# Patient Record
Sex: FEMALE | Race: WHITE | Employment: OTHER | URBAN - METROPOLITAN AREA
[De-identification: names, ages, dates, MRNs, and addresses within clinical notes are randomized per-mention and may not be internally consistent; named-entity substitution may affect disease eponyms.]

---

## 2017-04-04 ENCOUNTER — OFFICE VISIT (OUTPATIENT)
Dept: INTERNAL MEDICINE CLINIC | Age: 77
End: 2017-04-04

## 2017-04-04 ENCOUNTER — HOSPITAL ENCOUNTER (OUTPATIENT)
Dept: LAB | Age: 77
Discharge: HOME | End: 2017-04-04
Payer: MEDICARE

## 2017-04-04 VITALS
BODY MASS INDEX: 39.49 KG/M2 | HEIGHT: 65 IN | OXYGEN SATURATION: 98 % | SYSTOLIC BLOOD PRESSURE: 114 MMHG | HEART RATE: 68 BPM | DIASTOLIC BLOOD PRESSURE: 72 MMHG | TEMPERATURE: 97.3 F | RESPIRATION RATE: 20 BRPM | WEIGHT: 237 LBS

## 2017-04-04 DIAGNOSIS — G89.29 CHRONIC PAIN OF RIGHT LOWER EXTREMITY: Primary | ICD-10-CM

## 2017-04-04 DIAGNOSIS — I25.83 CORONARY ARTERY DISEASE DUE TO LIPID RICH PLAQUE: ICD-10-CM

## 2017-04-04 DIAGNOSIS — G47.00 INSOMNIA, UNSPECIFIED TYPE: ICD-10-CM

## 2017-04-04 DIAGNOSIS — M17.9 OSTEOARTHRITIS OF KNEE, UNSPECIFIED LATERALITY, UNSPECIFIED OSTEOARTHRITIS TYPE: ICD-10-CM

## 2017-04-04 DIAGNOSIS — E55.9 VITAMIN D DEFICIENCY: ICD-10-CM

## 2017-04-04 DIAGNOSIS — E78.00 PURE HYPERCHOLESTEROLEMIA: ICD-10-CM

## 2017-04-04 DIAGNOSIS — R26.89 BALANCE PROBLEM: ICD-10-CM

## 2017-04-04 DIAGNOSIS — M79.604 CHRONIC PAIN OF RIGHT LOWER EXTREMITY: Primary | ICD-10-CM

## 2017-04-04 DIAGNOSIS — I25.10 CORONARY ARTERY DISEASE DUE TO LIPID RICH PLAQUE: ICD-10-CM

## 2017-04-04 PROCEDURE — 80053 COMPREHEN METABOLIC PANEL: CPT

## 2017-04-04 PROCEDURE — 85025 COMPLETE CBC W/AUTO DIFF WBC: CPT

## 2017-04-04 PROCEDURE — 82306 VITAMIN D 25 HYDROXY: CPT

## 2017-04-04 RX ORDER — DICLOFENAC SODIUM 10 MG/G
4 GEL TOPICAL
Qty: 300 G | Refills: 2 | Status: SHIPPED | OUTPATIENT
Start: 2017-04-04 | End: 2017-05-02

## 2017-04-04 RX ORDER — ZOLPIDEM TARTRATE 5 MG/1
TABLET ORAL
Qty: 45 TAB | Refills: 3 | Status: SHIPPED | OUTPATIENT
Start: 2017-04-04

## 2017-04-04 RX ORDER — HYDROCODONE BITARTRATE AND ACETAMINOPHEN 5; 325 MG/1; MG/1
1 TABLET ORAL
Qty: 20 TAB | Refills: 0 | Status: SHIPPED | OUTPATIENT
Start: 2017-04-04 | End: 2017-05-02

## 2017-04-04 NOTE — PROGRESS NOTES
Health Maintenance Due   Topic Date Due    DTaP/Tdap/Td series (1 - Tdap) 06/17/1960    ZOSTER VACCINE AGE 60>  06/17/1999    GLAUCOMA SCREENING Q2Y  06/17/2004    OSTEOPOROSIS SCREENING (DEXA)  06/17/2004    Pneumococcal 65+ Low/Medium Risk (1 of 2 - PCV13) 06/17/2004    MEDICARE YEARLY EXAM  06/17/2004       Chief Complaint   Patient presents with    Follow-up     4 month    Coronary Artery Disease    Hypothyroidism    Depression    Leg Pain     bilateral legs muscle pain       1. Have you been to the ER, urgent care clinic since your last visit? Hospitalized since your last visit? No    2. Have you seen or consulted any other health care providers outside of the [address] [place] [address] since your last visit? Include any pap smears or colon screening. No    3) Do you have an Advance Directive on file? no    4) Are you interested in receiving information on Advance Directives? NO      Patient is accompanied by self I have received verbal consent from Kirill Lu to discuss any/all medical information while they are present in the room.

## 2017-04-04 NOTE — MR AVS SNAPSHOT
Visit Information Date & Time Provider Department Dept. Phone Encounter #  
 4/4/2017 11:30 AM Darylene Skinner, MD [facility] Internal Medicine [facility] [phone] [id number] Upcoming Health Maintenance Date Due DTaP/Tdap/Td series (1 - Tdap) 6/17/1960 ZOSTER VACCINE AGE 60> 6/17/1999 GLAUCOMA SCREENING Q2Y 6/17/2004 OSTEOPOROSIS SCREENING (DEXA) 6/17/2004 Pneumococcal 65+ Low/Medium Risk (1 of 2 - PCV13) 6/17/2004 MEDICARE YEARLY EXAM 6/17/2004 Allergies as of 4/4/2017  Review Complete On: 4/4/2017 By: Darylene Skinner, MD  
 No Known Allergies Current Immunizations  Never Reviewed No immunizations on file. Not reviewed this visit You Were Diagnosed With   
  
 Codes Comments Chronic pain of right lower extremity    -  Primary ICD-10-CM: M79.604, T44.69 ICD-9-CM: 729.5, 338.29 Osteoarthritis of knee, unspecified laterality, unspecified osteoarthritis type     ICD-10-CM: M17.10 ICD-9-CM: 715.36 Coronary artery disease due to lipid rich plaque     ICD-10-CM: I25.10, I25.83 ICD-9-CM: 414.00, 414.3 Pure hypercholesterolemia     ICD-10-CM: E78.00 ICD-9-CM: 272.0 Insomnia, unspecified type     ICD-10-CM: G47.00 ICD-9-CM: 780.52 Balance problem     ICD-10-CM: R26.89 
ICD-9-CM: 781.99 Vitamin D deficiency     ICD-10-CM: E55.9 ICD-9-CM: 268.9 Vitals BP Pulse Temp Resp Height(growth percentile) Weight(growth percentile) 114/72 (BP 1 Location: Left arm, BP Patient Position: Sitting) 68 97.3 °F (36.3 °C) (Oral) 20 5' 5\" (1.651 m) 237 lb (107.5 kg) SpO2 BMI OB Status Smoking Status 98% 39.44 kg/m2 Postmenopausal Never Smoker Vitals History BMI and BSA Data Body Mass Index Body Surface Area  
 39.44 kg/m 2 2.22 m 2 Preferred Pharmacy Pharmacy Name Phone [facility]. [phone] Your Updated Medication List  
  
   
 This list is accurate as of: 17 12:31 PM.  Always use your most recent med list.  
  
  
  
  
 aspirin 500 mg tablet Take 500 mg by mouth daily. clopidogrel 75 mg Tab Commonly known as:  PLAVIX TAKE 1 TABLET BY MOUTH EVERY DAY. diclofenac 1 % Gel Commonly known as:  VOLTAREN Apply 4 g to affected area three (3) times daily as needed. HYDROcodone-acetaminophen 5-325 mg per tablet Commonly known as:  Lia Rook Take 1 Tab by mouth two (2) times daily as needed for Pain. Max Daily Amount: 2 Tabs. levothyroxine 100 mcg tablet Commonly known as:  SYNTHROID  
TAKE 1 TABLET BY MOUTH EVERY MORNING. NexIUM 40 mg capsule Generic drug:  esomeprazole TAKE 1 CAPSULE BY MOUTH EVERY DAY. zolpidem 5 mg tablet Commonly known as:  AMBIEN  
1 tab po HS PRN Prescriptions Printed Refills  
 zolpidem (AMBIEN) 5 mg tablet 3 Si tab po HS PRN Class: Print HYDROcodone-acetaminophen (NORCO) 5-325 mg per tablet 0 Sig: Take 1 Tab by mouth two (2) times daily as needed for Pain. Max Daily Amount: 2 Tabs. Class: Print Route: Oral  
  
Prescriptions Sent to Pharmacy Refills  
 diclofenac (VOLTAREN) 1 % gel 2 Sig: Apply 4 g to affected area three (3) times daily as needed. Class: Normal  
 Pharmacy: [facility] Riggins Ph #: [phone] Route: Topical  
  
We Performed the Following CBC WITH AUTOMATED DIFF [24245 CPT(R)] METABOLIC PANEL, COMPREHENSIVE [34873 CPT(R)] REFERRAL TO PHYSICAL THERAPY [MAB68 Custom] Comments:  
 Please evaluate patient for gait and balance and ch right leg pain VITAMIN D, 25 HYDROXY [id number] CPT(R)] Referral Information Referral ID Referred By Referred To  
  
 [id number] Bryson Banda Not Available Visits Status Start Date End Date 1 New Request 17 If your referral has a status of pending review or denied, additional information will be sent to support the outcome of this decision. Introducing [facility] SERVICES! Jax Arreolas introduces [employer] patient portal. Now you can access parts of your medical record, email your doctor's office, and request medication refills online. 1. In your internet browser, go to [email or web address] 2. Click on the First Time User? Click Here link in the Sign In box. You will see the New Member Sign Up page. 3. Enter your [employer] Access Code exactly as it appears below. You will not need to use this code after youve completed the sign-up process. If you do not sign up before the expiration date, you must request a new code. · [employer] Access Code: [email or web address] Expires: 7/3/2017 12:31 PM 
 
4. Enter the last four digits of your Social Security Number (xxxx) and Date of Birth (mm/dd/yyyy) as indicated and click Submit. You will be taken to the next sign-up page. 5. Create a S[employer] ID. This will be your [employer] login ID and cannot be changed, so think of one that is secure and easy to remember. 6. Create a [employer] password. You can change your password at any time. 7. Enter your Password Reset Question and Answer. This can be used at a later time if you forget your password. 8. Enter your e-mail address. You will receive e-mail notification when new information is available in [address]. 9. Click Sign Up. You can now view and download portions of your medical record. 10. Click the Download Summary menu link to download a portable copy of your medical information. If you have questions, please visit the Frequently Asked Questions section of the [employer] website. Remember, [employer] is NOT to be used for urgent needs. For medical emergencies, dial 911. Now available from your iPhone and Android! Please provide this summary of care documentation to your next provider. Your primary care clinician is listed as Paul Servin. If you have any questions after today's visit, please call [phone].

## 2017-04-04 NOTE — PROGRESS NOTES
HISTORY OF PRESENT ILLNESS  Nelson [facility] is a 67 y.o. female here for follow  Up. Reports ch right leg pain. She had DVT in past,rigth leg is little swollen. She has chronic pain. She has CAD with stent placement,taking plavix 75 mg everyday. Has balance problem. need PT. She has elevated lipid,on statin. no myalgia. Follow-up   Pertinent negatives include no chest pain. Depression   Pertinent negatives include no chest pain. Leg Pain      Medication Refill   Pertinent negatives include no chest pain. Cholesterol Problem   Pertinent negatives include no chest pain. Review of Systems   Constitutional: Negative. Negative for chills and fever. HENT: Negative. Eyes: Negative. Negative for blurred vision and double vision. Respiratory: Negative. Cardiovascular: Negative. Negative for chest pain and palpitations. Gastrointestinal: Negative. Genitourinary: Negative. Musculoskeletal:        Leg pain     Neurological: Negative. Psychiatric/Behavioral: Positive for depression. Physical Exam   Constitutional: She appears well-developed and well-nourished. No distress. Neck: Normal range of motion. Neck supple. No JVD present. No thyromegaly present. Cardiovascular: Normal rate, regular rhythm, normal heart sounds and intact distal pulses. Pulmonary/Chest: Effort normal and breath sounds normal. No respiratory distress. She has no wheezes. She has no rales. Musculoskeletal: She exhibits no edema or tenderness. Right leg and thigh,slightly swollen. NT   Psychiatric: She has a normal mood and affect. Her behavior is normal.       ASSESSMENT and PLAN  Sandi Colbert was seen today for follow-up, coronary artery disease, hypothyroidism, depression and leg pain. Diagnoses and all orders for this visit:    Chronic pain of right lower extremity  Will call in,  -     HYDROcodone-acetaminophen (NORCO) 5-325 mg per tablet; Take 1 Tab by mouth two (2) times daily as needed for Pain.  Max Daily Amount: 2 Tabs. -     REFERRAL TO PHYSICAL THERAPY  -     CBC WITH AUTOMATED DIFF    Osteoarthritis of knee, unspecified laterality, unspecified osteoarthritis type  Will do,  -     diclofenac (VOLTAREN) 1 % gel; Apply 4 g to affected area three (3) times daily as needed. Coronary artery disease due to lipid rich plaque    Stable. -     METABOLIC PANEL, COMPREHENSIVE  -     CBC WITH AUTOMATED DIFF    Pure hypercholesterolemia  Stable. Insomnia, unspecified type  Will refill,  -     zolpidem (AMBIEN) 5 mg tablet; 1 tab po HS PRN    Balance problem  -     REFERRAL TO PHYSICAL THERAPY    Vitamin D deficiency  -     VITAMIN D, 25 HYDROXY        Discussed expected course/resolution/complications of diagnosis in detail with patient. Medication risks/benefits/costs/interactions/alternatives discussed with patient. Pt was given an after visit summary which includes diagnoses, current medications & vitals. Pt expressed understanding with the diagnosis and plan.

## 2017-04-04 NOTE — LETTER
4/6/2017 1:57 PM 
 
Ms. Rosas Goddard [address] [place] [address] [id number] Dear Rosas Goddard: 
 
Please find your most recent results below. Resulted Orders VITAMIN D, 25 HYDROXY Result Value Ref Range VITAMIN D, 25-HYDROXY 21.7 (L) 30.0 - 100.0 ng/mL Comment:  
   Vitamin D deficiency has been defined by the [facility] practice guideline as a 
level of serum 25-OH vitamin D less than 20 ng/mL (1,2). The Endocrine Society went on to further define vitamin D 
insufficiency as a level between 21 and 29 ng/mL (2). 1. IOM ([place] of Medicine). 2010. Dietary reference 
   intakes for calcium and D. [facility]: The 
   [employer]. 2. Genny MF, Sherri NC, Dk LEDEZMA, et al. 
   Evaluation, treatment, and prevention of vitamin D 
   deficiency: an Endocrine Society clinical practice 
   guideline. JCEM. 2011 Jul; 96(7):1911-30. Narrative Performed at:  [address]  [id number] : Elsy King MD, Phone:  [phone] METABOLIC PANEL, COMPREHENSIVE Result Value Ref Range Glucose 85 65 - 99 mg/dL BUN 18 8 - 27 mg/dL Creatinine 1.00 0.57 - 1.00 mg/dL GFR est non-AA 54 (L) >59 mL/min/1.73 GFR est AA 63 >59 mL/min/1.73  
 BUN/Creatinine ratio 18 12 - 28 Comment: **Please note reference interval change** Sodium 140 134 - 144 mmol/L Potassium 5.0 3.5 - 5.2 mmol/L Chloride 103 96 - 106 mmol/L  
 CO2 17 (L) 18 - 29 mmol/L Calcium 9.0 8.7 - 10.3 mg/dL Protein, total 6.8 6.0 - 8.5 g/dL Albumin 4.0 3.5 - 4.8 g/dL GLOBULIN, TOTAL 2.8 1.5 - 4.5 g/dL A-G Ratio 1.4 1.2 - 2.2 Comment: **Please note reference interval change** Bilirubin, total 0.3 0.0 - 1.2 mg/dL Alk.  phosphatase 56 39 - 117 IU/L  
 AST (SGOT) 27 0 - 40 IU/L  
 ALT (SGPT) 14 0 - 32 IU/L  
 Narrative Performed at:  [address]  [id number] : Lopez Sam MD, Phone:  [phone] CBC WITH AUTOMATED DIFF Result Value Ref Range WBC 7.6 3.4 - 10.8 x10E3/uL  
 RBC 4.18 3.77 - 5.28 x10E6/uL HGB 12.8 11.1 - 15.9 g/dL HCT 39.7 34.0 - 46.6 % MCV 95 79 - 97 fL  
 MCH 30.6 26.6 - 33.0 pg  
 MCHC 32.2 31.5 - 35.7 g/dL  
 RDW 14.9 12.3 - 15.4 % PLATELET 510 410 - 328 x10E3/uL NEUTROPHILS 54 % Lymphocytes 31 % MONOCYTES 9 % EOSINOPHILS 5 % BASOPHILS 1 %  
 ABS. NEUTROPHILS 4.1 1.4 - 7.0 x10E3/uL Abs Lymphocytes 2.4 0.7 - 3.1 x10E3/uL  
 ABS. MONOCYTES 0.7 0.1 - 0.9 x10E3/uL  
 ABS. EOSINOPHILS 0.4 0.0 - 0.4 x10E3/uL  
 ABS. BASOPHILS 0.1 0.0 - 0.2 x10E3/uL IMMATURE GRANULOCYTES 0 %  
 ABS. IMM. GRANS. 0.0 0.0 - 0.1 x10E3/uL Narrative Performed at:  [address]  [id number] : Lopez Sam MD, Phone:  [phone] CKD REPORT Result Value Ref Range Interpretation Note Comment:  
   Supplement report is available. Narrative Performed at:  [address] [place] [address]  [id number] : Janes Christian PhD, Phone:  [phone] RECOMMENDATIONS: 
Damian Eisenmenger your labs indicate that Your Vitamin D level is low, start taking OTC Vitamin D 2000 units 1 x a day for 4 months. Also increase your consumption of milk and exposure to the sun for 20 minutes a day. We will recheck your Vitamin D level in 4 months All other labs are stable Please call me if you have any questions: [phone] Sincerely, Gavin Esquivel MD

## 2017-04-05 LAB
25(OH)D3+25(OH)D2 SERPL-MCNC: 21.7 NG/ML (ref 30–100)
ALBUMIN SERPL-MCNC: 4 G/DL (ref 3.5–4.8)
ALBUMIN/GLOB SERPL: 1.4 {RATIO} (ref 1.2–2.2)
ALP SERPL-CCNC: 56 IU/L (ref 39–117)
ALT SERPL-CCNC: 14 IU/L (ref 0–32)
AST SERPL-CCNC: 27 IU/L (ref 0–40)
BASOPHILS # BLD AUTO: 0.1 X10E3/UL (ref 0–0.2)
BASOPHILS NFR BLD AUTO: 1 %
BILIRUB SERPL-MCNC: 0.3 MG/DL (ref 0–1.2)
BUN SERPL-MCNC: 18 MG/DL (ref 8–27)
BUN/CREAT SERPL: 18 (ref 12–28)
CALCIUM SERPL-MCNC: 9 MG/DL (ref 8.7–10.3)
CHLORIDE SERPL-SCNC: 103 MMOL/L (ref 96–106)
CO2 SERPL-SCNC: 17 MMOL/L (ref 18–29)
CREAT SERPL-MCNC: 1 MG/DL (ref 0.57–1)
EOSINOPHIL # BLD AUTO: 0.4 X10E3/UL (ref 0–0.4)
EOSINOPHIL NFR BLD AUTO: 5 %
ERYTHROCYTE [DISTWIDTH] IN BLOOD BY AUTOMATED COUNT: 14.9 % (ref 12.3–15.4)
GLOBULIN SER CALC-MCNC: 2.8 G/DL (ref 1.5–4.5)
GLUCOSE SERPL-MCNC: 85 MG/DL (ref 65–99)
HCT VFR BLD AUTO: 39.7 % (ref 34–46.6)
HGB BLD-MCNC: 12.8 G/DL (ref 11.1–15.9)
IMM GRANULOCYTES # BLD: 0 X10E3/UL (ref 0–0.1)
IMM GRANULOCYTES NFR BLD: 0 %
INTERPRETATION: NORMAL
LYMPHOCYTES # BLD AUTO: 2.4 X10E3/UL (ref 0.7–3.1)
LYMPHOCYTES NFR BLD AUTO: 31 %
MCH RBC QN AUTO: 30.6 PG (ref 26.6–33)
MCHC RBC AUTO-ENTMCNC: 32.2 G/DL (ref 31.5–35.7)
MCV RBC AUTO: 95 FL (ref 79–97)
MONOCYTES # BLD AUTO: 0.7 X10E3/UL (ref 0.1–0.9)
MONOCYTES NFR BLD AUTO: 9 %
NEUTROPHILS # BLD AUTO: 4.1 X10E3/UL (ref 1.4–7)
NEUTROPHILS NFR BLD AUTO: 54 %
PLATELET # BLD AUTO: 296 X10E3/UL (ref 150–379)
POTASSIUM SERPL-SCNC: 5 MMOL/L (ref 3.5–5.2)
PROT SERPL-MCNC: 6.8 G/DL (ref 6–8.5)
RBC # BLD AUTO: 4.18 X10E6/UL (ref 3.77–5.28)
SODIUM SERPL-SCNC: 140 MMOL/L (ref 134–144)
WBC # BLD AUTO: 7.6 X10E3/UL (ref 3.4–10.8)

## 2017-04-30 ENCOUNTER — HOSPITAL ENCOUNTER (EMERGENCY)
Age: 77
Discharge: HOME | End: 2017-04-30
Attending: STUDENT IN AN ORGANIZED HEALTH CARE EDUCATION/TRAINING PROGRAM
Payer: MEDICARE

## 2017-04-30 VITALS
SYSTOLIC BLOOD PRESSURE: 144 MMHG | RESPIRATION RATE: 18 BRPM | WEIGHT: 230 LBS | BODY MASS INDEX: 39.27 KG/M2 | HEIGHT: 64 IN | TEMPERATURE: 98 F | OXYGEN SATURATION: 98 % | HEART RATE: 60 BPM | DIASTOLIC BLOOD PRESSURE: 54 MMHG
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
## 2017-04-30 LAB
ALBUMIN SERPL BCP-MCNC: 3.8 G/DL (ref 3.5–5)
ALBUMIN/GLOB SERPL: 0.9 {RATIO} (ref 1.1–2.2)
ALP SERPL-CCNC: 70 U/L (ref 45–117)
ALT SERPL-CCNC: 21 U/L (ref 12–78)
ANION GAP BLD CALC-SCNC: 7 MMOL/L (ref 5–15)
APPEARANCE UR: ABNORMAL
AST SERPL W P-5'-P-CCNC: 18 U/L (ref 15–37)
BACTERIA URNS QL MICRO: ABNORMAL /HPF
BASOPHILS # BLD AUTO: 0 K/UL (ref 0–0.1)
BASOPHILS # BLD: 0 % (ref 0–1)
BILIRUB SERPL-MCNC: 0.5 MG/DL (ref 0.2–1)
BILIRUB UR QL: NEGATIVE
BUN SERPL-MCNC: 20 MG/DL (ref 6–20)
BUN/CREAT SERPL: 22 (ref 12–20)
CALCIUM SERPL-MCNC: 9.1 MG/DL (ref 8.5–10.1)
CHLORIDE SERPL-SCNC: 110 MMOL/L (ref 97–108)
CK SERPL-CCNC: 123 U/L (ref 26–192)
CO2 SERPL-SCNC: 26 MMOL/L (ref 21–32)
COLOR UR: ABNORMAL
CREAT SERPL-MCNC: 0.92 MG/DL (ref 0.55–1.02)
EOSINOPHIL # BLD: 0 K/UL (ref 0–0.4)
EOSINOPHIL NFR BLD: 0 % (ref 0–7)
EPITH CASTS URNS QL MICRO: ABNORMAL /LPF
ERYTHROCYTE [DISTWIDTH] IN BLOOD BY AUTOMATED COUNT: 14.2 % (ref 11.5–14.5)
GLOBULIN SER CALC-MCNC: 4.3 G/DL (ref 2–4)
GLUCOSE SERPL-MCNC: 113 MG/DL (ref 65–100)
GLUCOSE UR STRIP.AUTO-MCNC: NEGATIVE MG/DL
HCT VFR BLD AUTO: 43.6 % (ref 35–47)
HGB BLD-MCNC: 14.1 G/DL (ref 11.5–16)
HGB UR QL STRIP: ABNORMAL
HYALINE CASTS URNS QL MICRO: ABNORMAL /LPF (ref 0–5)
KETONES UR QL STRIP.AUTO: NEGATIVE MG/DL
LEUKOCYTE ESTERASE UR QL STRIP.AUTO: ABNORMAL
LYMPHOCYTES # BLD AUTO: 13 % (ref 12–49)
LYMPHOCYTES # BLD: 1.4 K/UL (ref 0.8–3.5)
MCH RBC QN AUTO: 31.1 PG (ref 26–34)
MCHC RBC AUTO-ENTMCNC: 32.3 G/DL (ref 30–36.5)
MCV RBC AUTO: 96 FL (ref 80–99)
MONOCYTES # BLD: 0.8 K/UL (ref 0–1)
MONOCYTES NFR BLD AUTO: 7 % (ref 5–13)
NEUTS SEG # BLD: 8.8 K/UL (ref 1.8–8)
NEUTS SEG NFR BLD AUTO: 80 % (ref 32–75)
NITRITE UR QL STRIP.AUTO: NEGATIVE
PH UR STRIP: 5 [PH] (ref 5–8)
PLATELET # BLD AUTO: 335 K/UL (ref 150–400)
POTASSIUM SERPL-SCNC: 4.6 MMOL/L (ref 3.5–5.1)
PROT SERPL-MCNC: 8.1 G/DL (ref 6.4–8.2)
PROT UR STRIP-MCNC: NEGATIVE MG/DL
RBC # BLD AUTO: 4.54 M/UL (ref 3.8–5.2)
RBC #/AREA URNS HPF: ABNORMAL /HPF (ref 0–5)
SODIUM SERPL-SCNC: 143 MMOL/L (ref 136–145)
SP GR UR REFRACTOMETRY: 1.03 (ref 1–1.03)
UROBILINOGEN UR QL STRIP.AUTO: 0.2 EU/DL (ref 0.2–1)
WBC # BLD AUTO: 11 K/UL (ref 3.6–11)
WBC URNS QL MICRO: ABNORMAL /HPF (ref 0–4)

## 2017-04-30 PROCEDURE — 99284 EMERGENCY DEPT VISIT MOD MDM: CPT

## 2017-04-30 PROCEDURE — 80053 COMPREHEN METABOLIC PANEL: CPT | Performed by: STUDENT IN AN ORGANIZED HEALTH CARE EDUCATION/TRAINING PROGRAM

## 2017-04-30 PROCEDURE — 85025 COMPLETE CBC W/AUTO DIFF WBC: CPT | Performed by: STUDENT IN AN ORGANIZED HEALTH CARE EDUCATION/TRAINING PROGRAM

## 2017-04-30 PROCEDURE — 93005 ELECTROCARDIOGRAM TRACING: CPT

## 2017-04-30 PROCEDURE — 36415 COLL VENOUS BLD VENIPUNCTURE: CPT | Performed by: STUDENT IN AN ORGANIZED HEALTH CARE EDUCATION/TRAINING PROGRAM

## 2017-04-30 PROCEDURE — 82550 ASSAY OF CK (CPK): CPT | Performed by: STUDENT IN AN ORGANIZED HEALTH CARE EDUCATION/TRAINING PROGRAM

## 2017-04-30 PROCEDURE — 81001 URINALYSIS AUTO W/SCOPE: CPT | Performed by: STUDENT IN AN ORGANIZED HEALTH CARE EDUCATION/TRAINING PROGRAM

## 2017-05-01 ENCOUNTER — PATIENT OUTREACH (OUTPATIENT)
Dept: INTERNAL MEDICINE CLINIC | Age: 77
End: 2017-05-01

## 2017-05-01 LAB
ATRIAL RATE: 57 BPM
CALCULATED P AXIS, ECG09: 59 DEGREES
CALCULATED R AXIS, ECG10: 14 DEGREES
CALCULATED T AXIS, ECG11: 27 DEGREES
DIAGNOSIS, 93000: NORMAL
P-R INTERVAL, ECG05: 192 MS
Q-T INTERVAL, ECG07: 432 MS
QRS DURATION, ECG06: 74 MS
QTC CALCULATION (BEZET), ECG08: 420 MS
VENTRICULAR RATE, ECG03: 57 BPM

## 2017-05-01 NOTE — ED NOTES
Discharge instructions given to pt. All questions answered and pt verbalized understanding. V/S stable @ time of discharge. Pt ambulatory out of unit with walker. Steady gait.

## 2017-05-01 NOTE — ED NOTES
Pt assisted to bedside commode with minimal assistance. Pt used walker for stabilization. Pt ambulatory down cowan with walker and had steady gait. Pt denies any new complaints. No obvious signs of distress. Will continue to monitor.

## 2017-05-01 NOTE — ED PROVIDER NOTES
HPI Comments: 67 y.o. female with past medical history significant for hypercholesterolemia, GERD, depression,  Coronary stent placement, and thyroid disease who presents from home with chief complaint of fall. Pt reports that she fell in the kitchen earlier today after a shower and was on the floor for about 12 hours. Pt reports that she fell because her legs gave out. Pt reports that she was unable to move due to chronic leg weakness. Pt normally ambulates with a walker. Pt reports that after 2 hip replacements in 2000, her legs have been weaker. Pt denies CP and current pain. Pt denies having a prior stroke. There are no other acute medical concerns at this time. Social hx: nonsmoker, EtOH use  PCP: Wendy Goldsmith MD    Note written by Lee Maldonado, as dictated by Aiden Kate MD 10:21 PM      The history is provided by the patient. No  was used. Past Medical History:   Diagnosis Date    Depression     GERD (gastroesophageal reflux disease)     Hypercholesterolemia     Thyroid disease        Past Surgical History:   Procedure Laterality Date    HX CORONARY STENT PLACEMENT  2005    x4    TOTAL HIP ARTHROPLASTY      bilateral         Family History:   Problem Relation Age of Onset    Kidney Disease Father     Kidney Disease Sister        Social History     Social History    Marital status: UNKNOWN     Spouse name: N/A    Number of children: N/A    Years of education: N/A     Occupational History    Not on file. Social History Main Topics    Smoking status: Never Smoker    Smokeless tobacco: Never Used    Alcohol use 0.5 oz/week     1 Glasses of wine per week      Comment: Wine daily    Drug use: No    Sexual activity: No     Other Topics Concern    Not on file     Social History Narrative         ALLERGIES: Review of patient's allergies indicates no known allergies.     Review of Systems   Constitutional: Negative for activity change, diaphoresis, fatigue and fever. HENT: Negative for congestion and sore throat. Eyes: Negative for photophobia and visual disturbance. Respiratory: Negative for chest tightness and shortness of breath. Cardiovascular: Negative for chest pain, palpitations and leg swelling. Gastrointestinal: Negative for abdominal pain, blood in stool, constipation, diarrhea, nausea and vomiting. Genitourinary: Negative for difficulty urinating, dysuria, flank pain, frequency and hematuria. Musculoskeletal: Negative for back pain. Neurological: Positive for weakness (left leg; chronic). Negative for dizziness, syncope, numbness and headaches. All other systems reviewed and are negative. There were no vitals filed for this visit. Physical Exam   Constitutional: She is oriented to person, place, and time. She appears well-developed and well-nourished. No distress. HENT:   Head: Normocephalic and atraumatic. Nose: Nose normal.   Mouth/Throat: Oropharynx is clear and moist. No oropharyngeal exudate. Eyes: Conjunctivae and EOM are normal. Right eye exhibits no discharge. Left eye exhibits no discharge. No scleral icterus. Neck: Normal range of motion. Neck supple. No JVD present. No tracheal deviation present. No thyromegaly present. Cardiovascular: Normal rate, regular rhythm, normal heart sounds and intact distal pulses. Exam reveals no gallop and no friction rub. No murmur heard. Pulmonary/Chest: Effort normal and breath sounds normal. No stridor. No respiratory distress. She has no wheezes. She has no rales. She exhibits no tenderness. Abdominal: Bowel sounds are normal. She exhibits no distension and no mass. There is no tenderness. There is no rebound. Musculoskeletal: Normal range of motion. She exhibits no edema or tenderness. 4/5 strength in left LE;  5/5 strength in right LE;     Lymphadenopathy:     She has no cervical adenopathy.    Neurological: She is alert and oriented to person, place, and time. No cranial nerve deficit. Coordination normal.   Skin: Skin is warm and dry. No rash noted. She is not diaphoretic. No erythema. No pallor. Psychiatric: She has a normal mood and affect. Her behavior is normal. Judgment and thought content normal.   Nursing note and vitals reviewed. Note written by Shell Galeas, Scribe, as dictated by Lissett Storm MD 10:22 PM      MDM  Number of Diagnoses or Management Options  Diagnosis management comments: Syncope, mechanical fall, rhabdomyolysis. 66 y/o female with concern of rhabdo as pt states she was laying on floor for aprox 12 hrs. HPI appears to be mechanical in nature. Will eval with cbc,c mp, ua, ecg, ck. Will ambulate prior to dc. Amount and/or Complexity of Data Reviewed  Clinical lab tests: ordered and reviewed  Review and summarize past medical records: yes    Risk of Complications, Morbidity, and/or Mortality  Presenting problems: moderate  Diagnostic procedures: moderate  Management options: moderate      ED Course       Procedures  ED EKG interpretation:  Rhythm: sinus bradycardia; and regular .  Rate (approx.): 57; Axis: normal; ST/T wave: no ST or T wave abnormality  Note written by Shell Galeas, Scribe, as dictated by Lissett Storm MD 10:32 PM

## 2017-05-01 NOTE — PROGRESS NOTES
Pt recently seen in [facility] on 4/30/17 for a fall. Pt had showered earlier in the morning and when in the kitchen, her legs simply gave out beneath her. Pt has hx of hip replacement back in 2000 and has chronic leg weakness. She was unable to get back up on her own and unable to get to her walker. Pt laid in place for nearly 12 hours. Pt reports having hit head, but no LOC. Her son reportedly had called [employer] to check on his mother (assumably was unable to get into contact with mother for some time and called for them to check on her). Pt resides in [place] [address]. Pt was assessed and found to have no outstanding abnormalities that would cause her to be held at the hospital. Pt ambulated with walker with steady gait without assistance and was released and returned home. Attempt to contact pt to f/u with recent fall. VM left for return call with contact name and phone number. Noted in chart that at previous [address] on 4/4/17 there was a referral for outpatient PT. Unsure if this has started yet.

## 2017-05-01 NOTE — DISCHARGE INSTRUCTIONS
We hope that we have addressed all of your medical concerns. The examination and treatment you received in the Emergency Department were for an emergent problem and were not intended as complete care. It is important that you follow up with your healthcare provider(s) for ongoing care. If your symptoms worsen or do not improve as expected, and you are unable to reach your usual health care provider(s), you should return to the Emergency Department. Today's healthcare is undergoing tremendous change, and patient satisfaction surveys are one of the many tools to assess the quality of medical care. You may receive a survey from the [employer] regarding your experience in the Emergency Department. I hope that your experience has been completely positive, particularly the medical care that I provided. As such, please participate in the survey; anything less than excellent does not meet my expectations or intentions. Replaced by [facility] and [address] [place] [address] participate in nationally recognized quality of care measures. If your blood pressure is greater than 120/80, as reported below, we urge that you seek medical care to address the potential of high blood pressure, commonly known as hypertension. Hypertension can be hereditary or can be caused by certain medical conditions, pain, stress, or \"white coat syndrome. \"       Please make an appointment with your health care provider(s) for follow up of your Emergency Department visit. VITALS:   Patient Vitals for the past 8 hrs:   Temp Pulse Resp BP SpO2   04/30/17 2159 97.7 °F (36.5 °C) (!) 59 18 155/66 99 %          Thank you for allowing us to provide you with medical care today. We realize that you have many choices for your emergency care needs. Please choose us in the future for any continued health care needs. [facility] [place], [facility] [place]. Office: [phone]            Recent Results (from the past 24 hour(s))   CBC WITH AUTOMATED DIFF    Collection Time: 04/30/17 10:42 PM   Result Value Ref Range    WBC 11.0 3.6 - 11.0 K/uL    RBC 4.54 3.80 - 5.20 M/uL    HGB 14.1 11.5 - 16.0 g/dL    HCT 43.6 35.0 - 47.0 %    MCV 96.0 80.0 - 99.0 FL    MCH 31.1 26.0 - 34.0 PG    MCHC 32.3 30.0 - 36.5 g/dL    RDW 14.2 11.5 - 14.5 %    PLATELET 731 147 - 324 K/uL    NEUTROPHILS 80 (H) 32 - 75 %    LYMPHOCYTES 13 12 - 49 %    MONOCYTES 7 5 - 13 %    EOSINOPHILS 0 0 - 7 %    BASOPHILS 0 0 - 1 %    ABS. NEUTROPHILS 8.8 (H) 1.8 - 8.0 K/UL    ABS. LYMPHOCYTES 1.4 0.8 - 3.5 K/UL    ABS. MONOCYTES 0.8 0.0 - 1.0 K/UL    ABS. EOSINOPHILS 0.0 0.0 - 0.4 K/UL    ABS. BASOPHILS 0.0 0.0 - 0.1 K/UL   METABOLIC PANEL, COMPREHENSIVE    Collection Time: 04/30/17 10:42 PM   Result Value Ref Range    Sodium 143 136 - 145 mmol/L    Potassium 4.6 3.5 - 5.1 mmol/L    Chloride 110 (H) 97 - 108 mmol/L    CO2 26 21 - 32 mmol/L    Anion gap 7 5 - 15 mmol/L    Glucose 113 (H) 65 - 100 mg/dL    BUN 20 6 - 20 MG/DL    Creatinine 0.92 0.55 - 1.02 MG/DL    BUN/Creatinine ratio 22 (H) 12 - 20      GFR est AA >60 >60 ml/min/1.73m2    GFR est non-AA 59 (L) >60 ml/min/1.73m2    Calcium 9.1 8.5 - 10.1 MG/DL    Bilirubin, total 0.5 0.2 - 1.0 MG/DL    ALT (SGPT) 21 12 - 78 U/L    AST (SGOT) 18 15 - 37 U/L    Alk. phosphatase 70 45 - 117 U/L    Protein, total 8.1 6.4 - 8.2 g/dL    Albumin 3.8 3.5 - 5.0 g/dL    Globulin 4.3 (H) 2.0 - 4.0 g/dL    A-G Ratio 0.9 (L) 1.1 - 2.2     CK W/ REFLX CKMB    Collection Time: 04/30/17 10:42 PM   Result Value Ref Range     26 - 192 U/L       No results found. Preventing Falls: Care Instructions  Your Care Instructions  Getting around your home safely can be a challenge if you have injuries or health problems that make it easy for you to fall.  Loose rugs and furniture in walkways are among the dangers for many older people who have problems walking or who have poor eyesight. People who have conditions such as arthritis, osteoporosis, or dementia also have to be careful not to fall. You can make your home safer with a few simple measures. Follow-up care is a key part of your treatment and safety. Be sure to make and go to all appointments, and call your doctor if you are having problems. It's also a good idea to know your test results and keep a list of the medicines you take. How can you care for yourself at home? Taking care of yourself  · You may get dizzy if you do not drink enough water. To prevent dehydration, drink plenty of fluids, enough so that your urine is light yellow or clear like water. Choose water and other caffeine-free clear liquids. If you have kidney, heart, or liver disease and have to limit fluids, talk with your doctor before you increase the amount of fluids you drink. · Exercise regularly to improve your strength, muscle tone, and balance. Walk if you can. Swimming may be a good choice if you cannot walk easily. · Have your vision and hearing checked each year or any time you notice a change. If you have trouble seeing and hearing, you might not be able to avoid objects and could lose your balance. · Know the side effects of the medicines you take. Ask your doctor or pharmacist whether the medicines you take can affect your balance. Sleeping pills or sedatives can affect your balance. · Limit the amount of alcohol you drink. Alcohol can impair your balance and other senses. · Ask your doctor whether calluses or corns on your feet need to be removed. If you wear loose-fitting shoes because of calluses or corns, you can lose your balance and fall. · Talk to your doctor if you have numbness in your feet. Preventing falls at home  · Remove raised doorway thresholds, throw rugs, and clutter. Repair loose carpet or raised areas in the floor. · Move furniture and electrical cords to keep them out of walking paths.   · Use nonskid floor wax, and wipe up spills right away, especially on ceramic tile floors. · If you use a walker or cane, put rubber tips on it. If you use crutches, clean the bottoms of them regularly with an abrasive pad, such as steel wool. · Keep your house well lit, especially Gamal Andie, and outside walkways. Use night-lights in areas such as hallways and bathrooms. Add extra light switches or use remote switches (such as switches that go on or off when you clap your hands) to make it easier to turn lights on if you have to get up during the night. · Install sturdy handrails on stairways. · Move items in your cabinets so that the things you use a lot are on the lower shelves (about waist level). · Keep a cordless phone and a flashlight with new batteries by your bed. If possible, put a phone in each of the main rooms of your house, or carry a cell phone in case you fall and cannot reach a phone. Or, you can wear a device around your neck or wrist. You push a button that sends a signal for help. · Wear low-heeled shoes that fit well and give your feet good support. Use footwear with nonskid soles. Check the heels and soles of your shoes for wear. Repair or replace worn heels or soles. · Do not wear socks without shoes on wood floors. · Walk on the grass when the sidewalks are slippery. If you live in an area that gets snow and ice in the winter, sprinkle salt on slippery steps and sidewalks. Preventing falls in the bath  · Install grab bars and nonskid mats inside and outside your shower or tub and near the toilet and sinks. · Use shower chairs and bath benches. · Use a hand-held shower head that will allow you to sit while showering. · Get into a tub or shower by putting the weaker leg in first. Get out of a tub or shower with your strong side first.  · Repair loose toilet seats and consider installing a raised toilet seat to make getting on and off the toilet easier.   · Keep your bathroom door unlocked while you are in the shower. Where can you learn more? Go to http://tyshawn-tee.info/. Enter [phone] in the search box to learn more about \"Preventing Falls: Care Instructions. \"  Current as of: August 4, 2016  Content Version: 11.2  © 9456-3140 Georgina Goodman. Care instructions adapted under license by [employer] (which disclaims liability or warranty for this information). If you have questions about a medical condition or this instruction, always ask your healthcare professional. [address] any warranty or liability for your use of this information.

## 2017-05-01 NOTE — ED TRIAGE NOTES
Pt. States she took a shower this am, went into the kitchen and her legs gave out and she fell to the floor. Hit head on floor; no LOC. Was unable to get to walker to get up. Son called security to check on pt. Pt. Lives in independent living at [facility].  Incident occurred 1000 this am.

## 2017-05-02 ENCOUNTER — APPOINTMENT (OUTPATIENT)
Dept: CT IMAGING | Age: 77
DRG: 552 | End: 2017-05-02
Attending: STUDENT IN AN ORGANIZED HEALTH CARE EDUCATION/TRAINING PROGRAM
Payer: MEDICARE

## 2017-05-02 ENCOUNTER — APPOINTMENT (OUTPATIENT)
Dept: GENERAL RADIOLOGY | Age: 77
DRG: 552 | End: 2017-05-02
Attending: FAMILY MEDICINE
Payer: MEDICARE

## 2017-05-02 ENCOUNTER — OFFICE VISIT (OUTPATIENT)
Dept: INTERNAL MEDICINE CLINIC | Age: 77
End: 2017-05-02

## 2017-05-02 ENCOUNTER — HOSPITAL ENCOUNTER (INPATIENT)
Age: 77
LOS: 3 days | Discharge: NURSING, REHAB OR LONG-TERM CARE | DRG: 552 | End: 2017-05-07
Attending: STUDENT IN AN ORGANIZED HEALTH CARE EDUCATION/TRAINING PROGRAM | Admitting: FAMILY MEDICINE
Payer: MEDICARE

## 2017-05-02 ENCOUNTER — PATIENT OUTREACH (OUTPATIENT)
Dept: INTERNAL MEDICINE CLINIC | Age: 77
End: 2017-05-02

## 2017-05-02 ENCOUNTER — APPOINTMENT (OUTPATIENT)
Dept: CT IMAGING | Age: 77
DRG: 552 | End: 2017-05-02
Attending: FAMILY MEDICINE
Payer: MEDICARE

## 2017-05-02 VITALS
RESPIRATION RATE: 20 BRPM | SYSTOLIC BLOOD PRESSURE: 124 MMHG | HEIGHT: 64 IN | TEMPERATURE: 98.2 F | HEART RATE: 74 BPM | OXYGEN SATURATION: 98 % | DIASTOLIC BLOOD PRESSURE: 64 MMHG

## 2017-05-02 DIAGNOSIS — M79.604 CHRONIC PAIN OF RIGHT LOWER EXTREMITY: ICD-10-CM

## 2017-05-02 DIAGNOSIS — N39.0 URINARY TRACT INFECTION WITHOUT HEMATURIA, SITE UNSPECIFIED: Primary | ICD-10-CM

## 2017-05-02 DIAGNOSIS — R53.1 WEAKNESS: ICD-10-CM

## 2017-05-02 DIAGNOSIS — R29.898 WEAKNESS OF BOTH LEGS: Primary | ICD-10-CM

## 2017-05-02 DIAGNOSIS — G89.29 CHRONIC PAIN OF RIGHT LOWER EXTREMITY: ICD-10-CM

## 2017-05-02 DIAGNOSIS — R26.81 GAIT INSTABILITY: ICD-10-CM

## 2017-05-02 DIAGNOSIS — E66.01 MORBID OBESITY, UNSPECIFIED OBESITY TYPE (HCC): ICD-10-CM

## 2017-05-02 DIAGNOSIS — I25.83 CORONARY ARTERY DISEASE DUE TO LIPID RICH PLAQUE: ICD-10-CM

## 2017-05-02 DIAGNOSIS — I25.10 CORONARY ARTERY DISEASE DUE TO LIPID RICH PLAQUE: ICD-10-CM

## 2017-05-02 LAB
ALBUMIN SERPL BCP-MCNC: 3.4 G/DL (ref 3.5–5)
ALBUMIN/GLOB SERPL: 0.9 {RATIO} (ref 1.1–2.2)
ALP SERPL-CCNC: 64 U/L (ref 45–117)
ALT SERPL-CCNC: 22 U/L (ref 12–78)
ANION GAP BLD CALC-SCNC: 9 MMOL/L (ref 5–15)
APPEARANCE UR: ABNORMAL
AST SERPL W P-5'-P-CCNC: 28 U/L (ref 15–37)
BACTERIA URNS QL MICRO: NEGATIVE /HPF
BASOPHILS # BLD AUTO: 0.1 K/UL (ref 0–0.1)
BASOPHILS # BLD: 1 % (ref 0–1)
BILIRUB SERPL-MCNC: 0.4 MG/DL (ref 0.2–1)
BILIRUB UR QL CFM: NEGATIVE
BUN SERPL-MCNC: 14 MG/DL (ref 6–20)
BUN/CREAT SERPL: 16 (ref 12–20)
CALCIUM SERPL-MCNC: 8.3 MG/DL (ref 8.5–10.1)
CHLORIDE SERPL-SCNC: 107 MMOL/L (ref 97–108)
CK MB CFR SERPL CALC: 0.6 % (ref 0–2.5)
CK MB SERPL-MCNC: 2.7 NG/ML (ref 5–25)
CK SERPL-CCNC: 423 U/L (ref 26–192)
CO2 SERPL-SCNC: 25 MMOL/L (ref 21–32)
COLOR UR: ABNORMAL
CREAT SERPL-MCNC: 0.89 MG/DL (ref 0.55–1.02)
EOSINOPHIL # BLD: 0.2 K/UL (ref 0–0.4)
EOSINOPHIL NFR BLD: 2 % (ref 0–7)
EPITH CASTS URNS QL MICRO: ABNORMAL /LPF
ERYTHROCYTE [DISTWIDTH] IN BLOOD BY AUTOMATED COUNT: 14.1 % (ref 11.5–14.5)
GLOBULIN SER CALC-MCNC: 3.8 G/DL (ref 2–4)
GLUCOSE SERPL-MCNC: 102 MG/DL (ref 65–100)
GLUCOSE UR STRIP.AUTO-MCNC: NEGATIVE MG/DL
HCT VFR BLD AUTO: 39.9 % (ref 35–47)
HGB BLD-MCNC: 12.9 G/DL (ref 11.5–16)
HGB UR QL STRIP: ABNORMAL
KETONES UR QL STRIP.AUTO: NEGATIVE MG/DL
LEUKOCYTE ESTERASE UR QL STRIP.AUTO: ABNORMAL
LYMPHOCYTES # BLD AUTO: 23 % (ref 12–49)
LYMPHOCYTES # BLD: 1.9 K/UL (ref 0.8–3.5)
MCH RBC QN AUTO: 30.9 PG (ref 26–34)
MCHC RBC AUTO-ENTMCNC: 32.3 G/DL (ref 30–36.5)
MCV RBC AUTO: 95.7 FL (ref 80–99)
MONOCYTES # BLD: 0.9 K/UL (ref 0–1)
MONOCYTES NFR BLD AUTO: 11 % (ref 5–13)
MUCOUS THREADS URNS QL MICRO: ABNORMAL /LPF
NEUTS SEG # BLD: 5.1 K/UL (ref 1.8–8)
NEUTS SEG NFR BLD AUTO: 63 % (ref 32–75)
NITRITE UR QL STRIP.AUTO: NEGATIVE
PH UR STRIP: 5.5 [PH] (ref 5–8)
PLATELET # BLD AUTO: 278 K/UL (ref 150–400)
POTASSIUM SERPL-SCNC: 3.4 MMOL/L (ref 3.5–5.1)
PROT SERPL-MCNC: 7.2 G/DL (ref 6.4–8.2)
PROT UR STRIP-MCNC: ABNORMAL MG/DL
RBC # BLD AUTO: 4.17 M/UL (ref 3.8–5.2)
RBC #/AREA URNS HPF: ABNORMAL /HPF (ref 0–5)
SODIUM SERPL-SCNC: 141 MMOL/L (ref 136–145)
SP GR UR REFRACTOMETRY: >1.03 (ref 1–1.03)
TROPONIN I SERPL-MCNC: 0.17 NG/ML
TROPONIN I SERPL-MCNC: 0.19 NG/ML
UROBILINOGEN UR QL STRIP.AUTO: 1 EU/DL (ref 0.2–1)
WBC # BLD AUTO: 8.1 K/UL (ref 3.6–11)
WBC URNS QL MICRO: >100 /HPF (ref 0–4)

## 2017-05-02 PROCEDURE — 82550 ASSAY OF CK (CPK): CPT | Performed by: STUDENT IN AN ORGANIZED HEALTH CARE EDUCATION/TRAINING PROGRAM

## 2017-05-02 PROCEDURE — 74011000258 HC RX REV CODE- 258: Performed by: STUDENT IN AN ORGANIZED HEALTH CARE EDUCATION/TRAINING PROGRAM

## 2017-05-02 PROCEDURE — 74011000258 HC RX REV CODE- 258: Performed by: FAMILY MEDICINE

## 2017-05-02 PROCEDURE — 51701 INSERT BLADDER CATHETER: CPT

## 2017-05-02 PROCEDURE — 71010 XR CHEST PORT: CPT

## 2017-05-02 PROCEDURE — 81001 URINALYSIS AUTO W/SCOPE: CPT | Performed by: EMERGENCY MEDICINE

## 2017-05-02 PROCEDURE — 87086 URINE CULTURE/COLONY COUNT: CPT | Performed by: STUDENT IN AN ORGANIZED HEALTH CARE EDUCATION/TRAINING PROGRAM

## 2017-05-02 PROCEDURE — 70450 CT HEAD/BRAIN W/O DYE: CPT

## 2017-05-02 PROCEDURE — 80053 COMPREHEN METABOLIC PANEL: CPT | Performed by: EMERGENCY MEDICINE

## 2017-05-02 PROCEDURE — 77030005563 HC CATH URETH INT MMGH -A

## 2017-05-02 PROCEDURE — 97161 PT EVAL LOW COMPLEX 20 MIN: CPT

## 2017-05-02 PROCEDURE — 93005 ELECTROCARDIOGRAM TRACING: CPT

## 2017-05-02 PROCEDURE — 84484 ASSAY OF TROPONIN QUANT: CPT | Performed by: EMERGENCY MEDICINE

## 2017-05-02 PROCEDURE — 87077 CULTURE AEROBIC IDENTIFY: CPT | Performed by: STUDENT IN AN ORGANIZED HEALTH CARE EDUCATION/TRAINING PROGRAM

## 2017-05-02 PROCEDURE — 85025 COMPLETE CBC W/AUTO DIFF WBC: CPT | Performed by: EMERGENCY MEDICINE

## 2017-05-02 PROCEDURE — 87186 SC STD MICRODIL/AGAR DIL: CPT | Performed by: STUDENT IN AN ORGANIZED HEALTH CARE EDUCATION/TRAINING PROGRAM

## 2017-05-02 PROCEDURE — 99218 HC RM OBSERVATION: CPT

## 2017-05-02 PROCEDURE — 96366 THER/PROPH/DIAG IV INF ADDON: CPT

## 2017-05-02 PROCEDURE — 93970 EXTREMITY STUDY: CPT

## 2017-05-02 PROCEDURE — 74011250636 HC RX REV CODE- 250/636: Performed by: FAMILY MEDICINE

## 2017-05-02 PROCEDURE — 72131 CT LUMBAR SPINE W/O DYE: CPT

## 2017-05-02 PROCEDURE — 96365 THER/PROPH/DIAG IV INF INIT: CPT

## 2017-05-02 PROCEDURE — 97530 THERAPEUTIC ACTIVITIES: CPT

## 2017-05-02 PROCEDURE — 99285 EMERGENCY DEPT VISIT HI MDM: CPT

## 2017-05-02 PROCEDURE — 74011250636 HC RX REV CODE- 250/636: Performed by: STUDENT IN AN ORGANIZED HEALTH CARE EDUCATION/TRAINING PROGRAM

## 2017-05-02 PROCEDURE — 36415 COLL VENOUS BLD VENIPUNCTURE: CPT | Performed by: EMERGENCY MEDICINE

## 2017-05-02 PROCEDURE — 82553 CREATINE MB FRACTION: CPT | Performed by: STUDENT IN AN ORGANIZED HEALTH CARE EDUCATION/TRAINING PROGRAM

## 2017-05-02 PROCEDURE — 73560 X-RAY EXAM OF KNEE 1 OR 2: CPT

## 2017-05-02 PROCEDURE — 96367 TX/PROPH/DG ADDL SEQ IV INF: CPT

## 2017-05-02 PROCEDURE — 74011250637 HC RX REV CODE- 250/637: Performed by: FAMILY MEDICINE

## 2017-05-02 PROCEDURE — 97116 GAIT TRAINING THERAPY: CPT

## 2017-05-02 RX ORDER — POTASSIUM CHLORIDE 7.45 MG/ML
10 INJECTION INTRAVENOUS
Status: COMPLETED | OUTPATIENT
Start: 2017-05-02 | End: 2017-05-02

## 2017-05-02 RX ORDER — ASPIRIN 325 MG
325 TABLET ORAL ONCE
Status: COMPLETED | OUTPATIENT
Start: 2017-05-02 | End: 2017-05-02

## 2017-05-02 RX ORDER — CLOPIDOGREL BISULFATE 75 MG/1
75 TABLET ORAL DAILY
Status: DISCONTINUED | OUTPATIENT
Start: 2017-05-03 | End: 2017-05-02

## 2017-05-02 RX ORDER — SODIUM CHLORIDE 0.9 % (FLUSH) 0.9 %
5-10 SYRINGE (ML) INJECTION AS NEEDED
Status: DISCONTINUED | OUTPATIENT
Start: 2017-05-02 | End: 2017-05-07 | Stop reason: HOSPADM

## 2017-05-02 RX ORDER — ASPIRIN 325 MG
325 TABLET ORAL 3 TIMES DAILY
Status: DISCONTINUED | OUTPATIENT
Start: 2017-05-02 | End: 2017-05-07 | Stop reason: HOSPADM

## 2017-05-02 RX ORDER — PANTOPRAZOLE SODIUM 40 MG/1
40 TABLET, DELAYED RELEASE ORAL DAILY
Status: DISCONTINUED | OUTPATIENT
Start: 2017-05-03 | End: 2017-05-07 | Stop reason: HOSPADM

## 2017-05-02 RX ORDER — ZOLPIDEM TARTRATE 5 MG/1
5 TABLET ORAL
Status: DISCONTINUED | OUTPATIENT
Start: 2017-05-02 | End: 2017-05-07 | Stop reason: HOSPADM

## 2017-05-02 RX ORDER — CLOPIDOGREL BISULFATE 75 MG/1
75 TABLET ORAL DAILY
Status: DISCONTINUED | OUTPATIENT
Start: 2017-05-02 | End: 2017-05-07 | Stop reason: HOSPADM

## 2017-05-02 RX ORDER — SODIUM CHLORIDE 0.9 % (FLUSH) 0.9 %
5-10 SYRINGE (ML) INJECTION EVERY 8 HOURS
Status: DISCONTINUED | OUTPATIENT
Start: 2017-05-02 | End: 2017-05-07 | Stop reason: HOSPADM

## 2017-05-02 RX ORDER — ASPIRIN 325 MG
325 TABLET ORAL 3 TIMES DAILY
COMMUNITY

## 2017-05-02 RX ORDER — SODIUM CHLORIDE 9 MG/ML
75 INJECTION, SOLUTION INTRAVENOUS CONTINUOUS
Status: DISCONTINUED | OUTPATIENT
Start: 2017-05-02 | End: 2017-05-07 | Stop reason: HOSPADM

## 2017-05-02 RX ORDER — HEPARIN SODIUM 5000 [USP'U]/ML
5000 INJECTION, SOLUTION INTRAVENOUS; SUBCUTANEOUS EVERY 12 HOURS
Status: DISCONTINUED | OUTPATIENT
Start: 2017-05-02 | End: 2017-05-07 | Stop reason: HOSPADM

## 2017-05-02 RX ORDER — LEVOTHYROXINE SODIUM 100 UG/1
100 TABLET ORAL
Status: DISCONTINUED | OUTPATIENT
Start: 2017-05-03 | End: 2017-05-07 | Stop reason: HOSPADM

## 2017-05-02 RX ORDER — ACETAMINOPHEN 325 MG/1
650 TABLET ORAL
Status: DISCONTINUED | OUTPATIENT
Start: 2017-05-02 | End: 2017-05-07 | Stop reason: HOSPADM

## 2017-05-02 RX ADMIN — POTASSIUM CHLORIDE 10 MEQ: 10 INJECTION, SOLUTION INTRAVENOUS at 19:53

## 2017-05-02 RX ADMIN — SODIUM CHLORIDE 75 ML/HR: 900 INJECTION, SOLUTION INTRAVENOUS at 23:04

## 2017-05-02 RX ADMIN — CEFTRIAXONE 1 G: 1 INJECTION, POWDER, FOR SOLUTION INTRAMUSCULAR; INTRAVENOUS at 18:05

## 2017-05-02 RX ADMIN — ASPIRIN 325 MG: 325 TABLET ORAL at 23:05

## 2017-05-02 RX ADMIN — CEFTRIAXONE 1 G: 1 INJECTION, POWDER, FOR SOLUTION INTRAMUSCULAR; INTRAVENOUS at 23:06

## 2017-05-02 RX ADMIN — HEPARIN SODIUM 5000 UNITS: 5000 INJECTION, SOLUTION INTRAVENOUS; SUBCUTANEOUS at 23:06

## 2017-05-02 RX ADMIN — CLOPIDOGREL BISULFATE 75 MG: 75 TABLET ORAL at 23:05

## 2017-05-02 RX ADMIN — Medication 10 ML: at 23:06

## 2017-05-02 NOTE — ED PROVIDER NOTES
HPI Comments: 67 y.o. female with past medical history significant for bilateral hip replacements who presents from [address] [place] [address] [facility] assisted living via EMS with chief complaint of bilateral leg swelling and bilateral leg pain. She reports that she has had intermittent worseing bilateral leg weakness over the past few days. Pt reports that she normally ambulates with a walker and was able to ambulate at her baseline yesterday, but had a fall where she felt like her legs gave out from underneath her while she was out and afterward has not been able to stand or walk. She denies fever, chills, back pain, HA, arm weakness, facial asymmetry, slurred speech. There are no other acute medical concerns at this time. PCP: Haily Dowling MD  Note written by clinton Porter, as dictated by Georgiana Zafar MD 3:55 PM         The history is provided by the patient. Past Medical History:   Diagnosis Date    Depression     GERD (gastroesophageal reflux disease)     Hypercholesterolemia     Thyroid disease        Past Surgical History:   Procedure Laterality Date    HX CORONARY STENT PLACEMENT  2005    x4    TOTAL HIP ARTHROPLASTY      bilateral         Family History:   Problem Relation Age of Onset    Kidney Disease Father     Kidney Disease Sister        Social History     Social History    Marital status: UNKNOWN     Spouse name: N/A    Number of children: N/A    Years of education: N/A     Occupational History    Not on file. Social History Main Topics    Smoking status: Never Smoker    Smokeless tobacco: Never Used    Alcohol use 0.5 oz/week     1 Glasses of wine per week      Comment: Wine daily    Drug use: No    Sexual activity: No     Other Topics Concern    Not on file     Social History Narrative         ALLERGIES: Review of patient's allergies indicates no known allergies. Review of Systems   Constitutional: Negative for chills and fever.    HENT: Negative for congestion and sore throat. Respiratory: Negative for cough and shortness of breath. Cardiovascular: Negative for chest pain and palpitations. Gastrointestinal: Negative for abdominal pain, constipation, diarrhea, nausea and vomiting. Musculoskeletal: Positive for myalgias. Negative for arthralgias and back pain. Neurological: Positive for weakness. Negative for dizziness, numbness and headaches. All other systems reviewed and are negative. Vitals:    05/02/17 1514   Pulse: 63   Resp: 18   Temp: 97.5 °F (36.4 °C)   SpO2: 98%   Weight: 104.3 kg (230 lb)   Height: 5' 4\" (1.626 m)            Physical Exam   Constitutional: She is oriented to person, place, and time. She appears well-developed. Morbidly obese   HENT:   Head: Normocephalic and atraumatic. Eyes: Conjunctivae and EOM are normal. Pupils are equal, round, and reactive to light. Neck: Normal range of motion. Neck supple. Cardiovascular: Normal rate, regular rhythm and normal heart sounds. No murmur heard. Pulmonary/Chest: Effort normal and breath sounds normal. No respiratory distress. Abdominal: Soft. Bowel sounds are normal. She exhibits no distension. There is no tenderness. There is no rebound. Musculoskeletal: Normal range of motion. She exhibits no edema. Neurological: She is alert and oriented to person, place, and time. No cranial nerve deficit. She exhibits normal muscle tone. Coordination normal.   2/5 proximal strength in bilateral lower legs, 4/5 strength in ankles and feet bilaterally   Skin: Skin is warm and dry. No rash noted. Psychiatric: She has a normal mood and affect. Her behavior is normal.   Nursing note and vitals reviewed.    Note written by clinton Mcginnis, as dictated by Manoj Gerber MD 4:16 PM      [facility]  ED Course       Procedures

## 2017-05-02 NOTE — PROGRESS NOTES
Health Maintenance Due   Topic Date Due    DTaP/Tdap/Td series (1 - Tdap) 06/17/1960    ZOSTER VACCINE AGE 60>  06/17/1999    GLAUCOMA SCREENING Q2Y  06/17/2004    OSTEOPOROSIS SCREENING (DEXA)  06/17/2004    Pneumococcal 65+ Low/Medium Risk (1 of 2 - PCV13) 06/17/2004    MEDICARE YEARLY EXAM  06/17/2004       Chief Complaint   Patient presents with   Cindy Wilson     states fell sunday and was on floor x 10 hours, then yesterday went to [facility] for her spa treatment and fell getting into car with abrasion to her left knee       1. Have you been to the ER, urgent care clinic since your last visit? Hospitalized since your last visit? Yes When: Sunday Where: [facility] Reason for visit: fall    2. Have you seen or consulted any other health care providers outside of the [address] [place] [address] since your last visit? Include any pap smears or colon screening. No    3) Do you have an Advance Directive on file? no    4) Are you interested in receiving information on Advance Directives? NO      Patient is accompanied by self I have received verbal consent from Rosas Goddard to discuss any/all medical information while they are present in the room.

## 2017-05-02 NOTE — IP AVS SNAPSHOT
[address] [facility] [address] [place] [address] 
[phone] Patient: Gilmar Werner MRN: [id number] FTY:[birth year] You are allergic to the following No active allergies Recent Documentation Height Weight Breastfeeding? BMI OB Status Smoking Status 1.626 m 115.1 kg No 43.56 kg/m2 Postmenopausal Never Smoker Emergency Contacts Name Discharge Info Relation Home Work Mobile Deatra Purpura  Other Relative [6] [phone] About your hospitalization You were admitted on:  May 2, 2017 You last received care in the:  [facility] 6S NEURO-SCI TELE You were discharged on: May 7, 2017 Unit phone number:  [phone] Why you were hospitalized Your primary diagnosis was:  Weakness Your diagnoses also included:  Uti (Urinary Tract Infection) Providers Seen During Your Hospitalizations Provider Role Specialty Primary office phone Tracy Meeks MD Attending Provider Emergency Medicine [phone] Elva Cary MD Attending Provider Hospitalist [phone] Alejandra Leigh MD Attending Provider Internal Medicine [phone] Marko Haywood MD Attending Provider Internal Medicine [phone] Your Primary Care Physician (PCP) Primary Care Physician Office Phone Office Fax [address], [address] [address] [phone] Follow-up Information Follow up With Details Comments Contact Info Malgor[address] [place] [address]   [address] [facility] 
[phone] Dexter Roche MD   [address] [place] [address] 
[phone] Current Discharge Medication List  
  
START taking these medications Dose & Instructions Dispensing Information Comments Morning Noon Evening Bedtime  
 cephALEXin 500 mg capsule Commonly known as:  Darlandon Mckeon Your last dose was:     
   
Your next dose is:    
   
   
 Dose:  500 mg  
 Take 1 Cap by mouth three (3) times daily for 5 days. Quantity:  15 Cap Refills:  0 CONTINUE these medications which have NOT CHANGED Dose & Instructions Dispensing Information Comments Morning Noon Evening Bedtime  
 aspirin 325 mg tablet Commonly known as:  ASPIRIN Your last dose was: Your next dose is:    
   
   
 Dose:  325 mg Take 325 mg by mouth three (3) times daily. Refills:  0  
     
   
   
   
  
 clopidogrel 75 mg Tab Commonly known as:  PLAVIX Your last dose was: Your next dose is: TAKE 1 TABLET BY MOUTH EVERY DAY. Quantity:  30 tablet Refills:  5  
     
   
   
   
  
 levothyroxine 100 mcg tablet Commonly known as:  SYNTHROID Your last dose was: Your next dose is: TAKE 1 TABLET BY MOUTH EVERY MORNING. Quantity:  30 Tab Refills:  5 NexIUM 40 mg capsule Generic drug:  esomeprazole Your last dose was: Your next dose is: TAKE 1 CAPSULE BY MOUTH EVERY DAY. Quantity:  30 capsule Refills:  5  
     
   
   
   
  
 zolpidem 5 mg tablet Commonly known as:  AMBIEN Your last dose was: Your next dose is:    
   
   
 1 tab po HS PRN Quantity:  45 Tab Refills:  3 Where to Get Your Medications Information on where to get these meds will be given to you by the nurse or doctor. ! Ask your nurse or doctor about these medications  
  cephALEXin 500 mg capsule Discharge Instructions None Discharge Orders None ACO Transitions of Care Introducing [employer] offers a voluntary care coordination program to provide high quality service and care to [facility] fee-for-service beneficiaries.   
 
Nayeli Zavaleta was designed to help you enhance your health and well-being through the following services: ? Transitions of Care  support for individuals who are transitioning from one care setting to another (example: Hospital to home). ? Chronic and Complex Care Coordination  support for individuals and caregivers of those with serious or chronic illnesses or with more than one chronic (ongoing) condition and those who take a number of different medications. If you meet specific medical criteria, a [facility] [address] may call you directly to coordinate your care with your primary care physician and your other care providers. For questions about the [facility] programs, please, contact your physicians office. For general questions or additional information about Accountable Care Organizations: 
Please visit www.medicare.gov/acos. html or call 1-800-MEDICARE (4-912.473.1539) TTY users should call 4-329.291.6957. Passado Announcement We are excited to announce that we are making your provider's discharge notes available to you in Passado. You will see these notes when they are completed and signed by the physician that discharged you from your recent hospital stay. If you have any questions or concerns about any information you see in Passado, please call the Health Information Department where you were seen or reach out to your Primary Care Provider for more information about your plan of care. Introducing [facility]! Yanna Guillen introduces Passado patient portal. Now you can access parts of your medical record, email your doctor's office, and request medication refills online. 1. In your internet browser, go to [email or web address] 2. Click on the First Time User? Click Here link in the Sign In box. You will see the New Member Sign Up page. 3. Enter your Passado Access Code exactly as it appears below. You will not need to use this code after youve completed the sign-up process.  If you do not sign up before the expiration date, you must request a new code. · [employer] Access Code: [email or web address] Expires: 7/3/2017 12:31 PM 
 
4. Enter the last four digits of your Social Security Number (xxxx) and Date of Birth (mm/dd/yyyy) as indicated and click Submit. You will be taken to the next sign-up page. 5. Create a [employer]builder ID. This will be your [employer]builder login ID and cannot be changed, so think of one that is secure and easy to remember. 6. Create a [employer] password. You can change your password at any time. 7. Enter your Password Reset Question and Answer. This can be used at a later time if you forget your password. 8. Enter your e-mail address. You will receive e-mail notification when new information is available in [address] 9. Click Sign Up. You can now view and download portions of your medical record. 10. Click the Download Summary menu link to download a portable copy of your medical information. If you have questions, please visit the Frequently Asked Questions section of the [employer] website. Remember, [employer]builder is NOT to be used for urgent needs. For medical emergencies, dial 911. Now available from your iPhone and Android! General Information Please provide this summary of care documentation to your next provider. Patient Signature:  ____________________________________________________________ Date:  ____________________________________________________________  
  
Latricia Brought Provider Signature:  ____________________________________________________________ Date:  ____________________________________________________________

## 2017-05-02 NOTE — SENIOR SERVICES NOTE
physical Therapy Emergency Department EVALUATION/DISCHARGE with CMS G codes  Patient: Tena Quintero (59 y.o. female)  Date: 5/2/2017  Primary Diagnosis: Leg pain        Precautions:   Fall  ASSESSMENT :  Based on the objective data described below, the patient presents with self limiting behavior, decreased insight, and c/o leg pain. Patient lives alone in independent living apt at [facility]. Patient reports having a history of leg pain and weakness \"for over 20 years. \"  Patient fell 4/30 and was brought to the ED; she amb without difficulty using RW and was d/c home. Per patient, she was \"walking around fine\" until yesterday evening when she was leaving the spa. Patient reports falling at the [address] and requiring 4 people to assist her into the car. When she drove to her apt, she was unable to get out of the car and security assisted her into her apt. Patient sat in her lift chair and did not move for over 17hrs. Patient urinated on herself but reports \"I felt safe. \"  PT and CM present for interview. Patient denies this being unsafe or unhygienic. Patient reports she does not have the money for an emergency pendant but later reports living in the [facility] and states she enjoys going on many cruises. Patient requiring encouragement for participation for evaluation; pt repeatedly stating \"I can't move. My legs don't work. They haven't for twenty years. \"  Patient then sit to edge of bed rui. Patient again report inability to stand with PT encouraging patient to put forth effort. Patient stand to rollator with stand by assistance; pt immediately report BLE pain but unable to describe where the pain is and how different it is that her usual leg pain. Patient refusing to participate in gait training in [facility], instead march in place and side step toward head of bed. Patient sit and refuse to continue. Patient return to supine with Amanda.   Patient reports she does not sleep in bed at home; she instead sleeps in a lift chair. Patient remain in bed with needs in reach. Patient's son is coming from [facility] this evening; plan to discuss patient's transition to assisted living. Upon d/c to apt, recommend home health PT/OT/aide with  care. Highly recommend transition to assisted living soon. PLAN :  Discharge Recommendations:   []   Home with family  []   Skilled nursing facility  []   Admission to hospital with rehab likely needed  []   Inpatient rehab referral  []   Outpatient physical therapy referral  [x]   Other: [place] apt with home services and     Further Equipment Recommendations for Discharge:   [x]   Rolling walker with 5\" wheels  []   Crutches   []   Eugena Pool   []   Wheelchair   []   Other:     COMMUNICATION/EDUCATION:   Communication/Collaboration:  [x]   Fall prevention education was provided and the patient/caregiver indicated understanding. [x]   Patient/family have participated as able and agree with findings and recommendations. []   Patient is unable to participate in plan of care at this time. Findings and recommendations were discussed with: ED physician and Care Management       SUBJECTIVE:   Patient stated Hima Barber was nothing wrong with me sitting in my chair. I felt safe there.     OBJECTIVE DATA SUMMARY:     Past Medical History:   Diagnosis Date    Depression     GERD (gastroesophageal reflux disease)     Hypercholesterolemia     Thyroid disease      Past Surgical History:   Procedure Laterality Date    HX CORONARY STENT PLACEMENT  2005    x4    TOTAL HIP ARTHROPLASTY      bilateral     Prior Level of Function/Home Situation: rui using RW; pt drives  Home Situation  Home Environment: Independent living  [facility] Name: [address] [place] [address]  (lives in the [place])  # Steps to Enter: 0  One/Two Story Residence: One story  Living Alone: Yes  Support Systems: Child(linda), Friends \ neighbors  Patient Expects to be Discharged to[de-identified] [address]  Current DME Used/Available at Home: [employer], rollator, Grab bars, Ladena Donate, straight  Tub or Shower Type: Tub/Shower combination       Critical Behavior:  Neurologic State: Alert, Appropriate for age  Orientation Level: Oriented X4  Cognition: Impaired decision making, Poor safety awareness         Strength:    Strength: Generally decreased, functional     Tone & Sensation:   Tone: Normal  Sensation: Impaired      Range Of Motion:  AROM: Generally decreased, functional  PROM: Within functional limits        Coordination:  Coordination: Generally decreased, functional    Functional Mobility:  Bed Mobility:  Supine to Sit: Modified independent; Additional time  Sit to Supine: Minimum assistance        Transfers:  Sit to Stand: Stand-by asssistance; Additional time  Stand to Sit: Stand-by asssistance        Balance:   Sitting: Intact  Standing: Impaired; With support  Standing - Static: Fair  Standing - Dynamic : Fair       Ambulation/Gait Training:  Distance (ft): 4 Feet (ft)  Assistive Device: Walker, rollator;Gait belt  Ambulation - Level of Assistance: Contact guard assistance  Gait Abnormalities: Decreased step clearance;Shuffling gait  Base of Support: Widened  Speed/Mere: Shuffled; Slow  Step Length: Right shortened;Left shortened       Functional Measure:  Tinetti test:    Sitting Balance: 1  Arises: 1  Attempts to Rise: 2  Immediate Standing Balance: 1  Standing Balance: 1  Nudged: 0  Eyes Closed: 0  Turn 360 Degrees - Continuous/Discontinuous: 0  Turn 360 Degrees - Steady/Unsteady: 0  Sitting Down: 1  Balance Score: 7  Indication of Gait: 0  R Step Length/Height: 0  L Step Length/Height: 0  R Foot Clearance: 1  L Foot Clearance: 1  Step Symmetry: 0  Step Continuity: 0  Path: 1  Trunk: 0  Walking Time: 0  Gait Score: 3  Total Score: 10       Tinetti Test and G-code impairment scale:  Percentage of Impairment CH    0%   CI    1-19% CJ    20-39% CK    40-59% CL    60-79% CM    80-99% CN     100%   Tinetti  Score 0-28 28 23-27 17-22 12-16 6-11 1-5 0       Tinetti Tool Score Risk of Falls  <19 = High Fall Risk  19-24 = Moderate Fall Risk  25-28 = Low Fall Risk  Hanseletti ME. Performance-Oriented Assessment of Mobility Problems in Elderly Patients. [place] 65; [id number]. (Scoring Description: PT Bulletin Feb. 10, 1993)    Older adults: Jabari Rosas et al, 2009; n = [facility] elderly evaluated with ABC, NAVIN, ADL, and IADL)  · Mean NAVIN score for males aged 69-68 years = 26.21(3.40)  · Mean NAVIN score for females age 69-68 years = 25.16(4.30)  · Mean NAVIN score for males over 80 years = 23.29(6.02)  · Mean NAVIN score for females over 80 years = 17.20(8.32)       In compliance with CMSs Claims Based Outcome Reporting, the following G-code set was chosen for this patient based on their primary functional limitation being treated: The outcome measure chosen to determine the severity of the functional limitation was the Tinetti with a score of 10/28 which was correlated with the impairment scale. ? Mobility - Walking and Moving Around:     - CURRENT STATUS: CL - 60%-79% impaired, limited or restricted    - GOAL STATUS: CL - 60%-79% impaired, limited or restricted    - D/C STATUS:  CL - 60%-79% impaired, limited or restricted     Pain:  Pain Scale 1: Numeric (0 - 10)  Pain Intensity 1: 0  Pain Location 1: Leg  Pain Orientation 1: Left;Right  Pain Description 1: Aching;Constant       Activity Tolerance:   Fair     Please refer to the flowsheet for vital signs taken during this treatment.   After treatment:   []         Patient left in no apparent distress sitting up in chair  [x]         Patient left in no apparent distress in bed  [x]         Call bell left within reach  [x]         Nursing notified  []         Caregiver present  []         Bed alarm activated        Thank you for this referral.  Leroy Mccoy, PT, DPT   Time Calculation: 36 mins

## 2017-05-02 NOTE — PROGRESS NOTES
Care Management Update    Received call from nurse navigator, Na Mcrae. She has been in contact with [facility]. Son Jamie Vail [phone] in travelling from [place] to [place]. Patient is aware that son is on the way to [place]. Hospitalist has been consulted. Na Mcrae - Nurse Navigator [phone]    Care Management will continue to follow for discharge planning. Per patient Antonio Prieto is coming to help me make hard decisions\".       Donato Sender, RN, BSN, [facility] Care Management  [phone]

## 2017-05-02 NOTE — PROGRESS NOTES
HISTORY OF PRESENT ILLNESS  Leyla Swann is a 67 y.o. female here brought by [employer] independent living security. Nurse navigator Zenaida Mahmood has called her this morning since she had a ER visit and found out she is itting on a chair since yesterday noon. She had fall on 4/30/17 and was sent to [facility] [address] are done and was sent back to home. She went for her spa treatment yesterday. She finished up treatment and  brought her car in [facility]  but she was not able to get in to her car. It took 4 people to put her in her car and she drove here in [employer]. She called security who picked her up from car and put her in a chair in her room. Since yesterday almost 15 to 20 hours hours she is sitting on her chair and urinating in chair. she is not able to stand up at all. She mention she is able to move her legs but she mention she is not able to feel her legs. had a fall 48 hrs back. no xray was done in ER. All labs reviewed. HPI    Review of Systems   Constitutional: Negative. HENT: Negative. Eyes: Negative. Respiratory: Negative. Cardiovascular: Negative. Gastrointestinal: Negative. Genitourinary: Negative. Musculoskeletal: Positive for falls, joint pain and myalgias. Skin: Negative. Neurological: Positive for focal weakness. Negative for sensory change. Psychiatric/Behavioral: Negative. Physical Exam   Constitutional: She appears well-developed and well-nourished. She appears distressed. Neck: Normal range of motion. Neck supple. Cardiovascular: Normal rate, regular rhythm, normal heart sounds and intact distal pulses. Pulmonary/Chest: Effort normal and breath sounds normal. No respiratory distress. She has no wheezes. Abdominal: Soft. Bowel sounds are normal. She exhibits no distension. There is no tenderness. Musculoskeletal: She exhibits tenderness. Left knee:bruise. tender. Both legs;tender. ROM restricted   Neurological: She displays abnormal reflex.  She exhibits abnormal muscle tone. Coordination abnormal.   Psychiatric: She has a normal mood and affect. Her behavior is normal.       ASSESSMENT and PLAN  Michelle Kelly was seen today for fall. Diagnoses and all orders for this visit:    Weakness of both legs    She is not able to stand up at all. For past 24 hours she is on a chair and urinating in chair since she is not able to stand. Able to move legs,no back pain but has knee and leg pain. ?cord compression or spinal stenosis. Need xray. -     XR SPINE LUMB 2 OR 3 V; Future    She need intense inpatient rehab. Not able to do direct admission to rehab and also need to get xray LS spine or MRI to rule out cord compression and or spinal stenosis,will refer her to ER>  ER nurse notified. Gait instability  -     XR SPINE LUMB 2 OR 3 V; Future    Chronic pain of right lower extremity  On pain med as needed. Coronary artery disease due to lipid rich plaque    Stable. on med. recent labs are stable. Morbid obesity, unspecified obesity type    On diet. Discussed expected course/resolution/complications of diagnosis in detail with patient. Medication risks/benefits/costs/interactions/alternatives discussed with patient. Pt was given an after visit summary which includes diagnoses, current medications & vitals. Pt expressed understanding with the diagnosis and plan.

## 2017-05-02 NOTE — PROGRESS NOTES
Admission Medication Reconciliation:    Information obtained from: patient    Significant PMH/Disease States:   Past Medical History:   Diagnosis Date    Depression     GERD (gastroesophageal reflux disease)     Hypercholesterolemia     Thyroid disease        Chief Complaint for this Admission:  fall    Allergies:  Review of patient's allergies indicates no known allergies. Prior to Admission Medications:   Prior to Admission Medications   Prescriptions Last Dose Informant Patient Reported? Taking? NEXIUM 40 mg capsule 2017 at Unknown time Self No Yes   Sig: TAKE 1 CAPSULE BY MOUTH EVERY DAY. aspirin (ASPIRIN) 325 mg tablet 2017 at Unknown time Self Yes Yes   Sig: Take 325 mg by mouth three (3) times daily. clopidogrel (PLAVIX) 75 mg tablet 2017 at Unknown time Self No Yes   Sig: TAKE 1 TABLET BY MOUTH EVERY DAY. levothyroxine (SYNTHROID) 100 mcg tablet 2017 at Unknown time Self No Yes   Sig: TAKE 1 TABLET BY MOUTH EVERY MORNING. zolpidem (AMBIEN) 5 mg tablet 2017 at Unknown time Self No Yes   Si tab po HS PRN      Facility-Administered Medications: None       Comments/Recommendations: Medication review done with patient. She states she takes about 1000mg aspirin daily for pain (325mg tid) in addition to Plavix. No medications taken today. Allergies reviewed.

## 2017-05-02 NOTE — SENIOR SERVICES NOTE
SSED PT consult received and appreciated. Chart reviewed; patient present to ED post multiple falls. Per today's physician note pt went to \[facility]\"" at [facility] but she was not able to get in to her car. It took 4 people to put her in her car and she drove here in [facility]. She called security who picked her up from car and put her in a chair in her room. Since yesterday almost 15 to 20 hours she is sitting on her chair and urinating in chair. She is not able to stand up at all. \"  Upon PT interview, patient reports having \"trouble with my legs for over 20 years. \"  Patient with no pain complaints. Patient denies any fall history prior to 4/30; pt reports her legs have \"just been sinking me to the ground. \"  Patient lives alone in independent living apt at [facility]; pt is baseline rui using rollator. Patient prepares meals and drives. Patient with flat affect throughout interview but agreeable to evaluation. Transport to CT arrives as eval begins; pt to have lumbar CT. PT to follow up as able and appropriate. Thanks. Kev Ferreira, [employer]

## 2017-05-02 NOTE — PROGRESS NOTES
Spoke to patient who verbalizes that she is doing very poorly. Feels she is unable to get out of her chair to mobilize. Is afraid to move about in her apartment due to her recent fall on Sunday. Pt describes the events that took place on Sunday that coincide to the description of the ED documentation. Pt is fearful of ambulation at this point. States that her son is coming from [place] today to come help her determine if she needs to transition to long-term of if she will be appropriate for around the clock sitters. Informed pt that around the clock sitters is an out of pocket cost, but that it will afford her the continued independence of being in own apartment and keeping her own schedule. Pt states she is fully capable of affording whatever is needed. Writer also suggested to patient having [facility] PT coming to evaluate the patient. Writer states will make some phone calls and will get back to patient with plan for the day. Pt voices appreciation. Informed Suzy Borden CM at [facility] of pt's recent fall and concerns. Possibly needing care providers around the clock. Called down to main office and [facility]. Explained that pt recently fall and is having extreme difficulty. Was unsure of availability of appointment and if not a space, if we could at least get an order for [facility] PT to see today due to pt's complaints. PSR states MD is nearby and able to speak. Discussed with MD that pt fell on Sunday and states has been unable to move from her chair. Had been laid in the floor for over 10 hours on Sunday and her legs will randomly give out. Is fearful of trying to ambulate and had wanted to know if someone could come to her room. Knows someone cannot come to the room, but wanted to know ideas for patient since she feels bound to the room. MD suggests having security get wheelchair and bring to office. If they are unavailable, to have [facility] bring patient to the office. MD would like to see her for evaluation.  Is very concerned about patient. Called  to coordinate security coming with wheelchair to take patient to MD's office for evaluation. Informed needed to bring as soon as possible for impromptu appointment due to recent fall. Returned call to patient. Informed security would be coming with wheelchair to bring down for appointment with MD. Pt voices appreciation for coordinating the arrangements. Received call from  that pt has finally arrived nearly an hour later. Pt arrived very disheveled. PSR noted that pt's hair was matted and dirty. Pt wearing a dirty shirt that appeared to have been rolled in mud. Pt did not have on pants. It was uncertain if pt had on underwear. Luckily the pt's shirt was loose and long enough to cover her bottom. Upon opening the window to check in the patient for her visit, there was a very strong odor of urine where the pt had been incontinent of urine. Stated the smell was strong enough the MD may not be able to close the door during visit due the the strength of the smell of the urine. Pt verbal[facility] to [facility] that she was in need of help and was unable to do anything for herself. PSR felt Dr. Jo Veras would be writing for [facility] for PT and aide assistance. Writer informed would reach out the [facility] personnel to give them a heads up since the patient was in such a dire state. Writer called and left  for [facility] re: potential order for [facility] and the fragile state of patient and hopes that the order could be expedited and addressed today. Spoke to [employer] who is helping to coordinate care for the residents. She informed that the patient was found naked after being on floor nearly 12 hours. Pt had informed security she had struck her head and should go to the hospital. They called EMS and had pt taken to the ED for evaluation and treatment. Was then informed that pt had the next day gone to the [facility] and fallen there.  Pt had to be picked up by security and nurse and wheeled back to her room. Pt was incontinent in the wheelchair and on the staff members. Pt skinned her knee as well. She was taken back to her room with their assistance. Spoke with [facility]. They informed that patient was in very poor state. Would like to have placed in SNF. Would like for writer to contact [facility] admissions regarding bed availability. Dr. Gwendolyn Bear does not feel pt is safe to return to apartment even with care aide assistance. Writer called and left message with [facility] admissions coordinator re: request from Dr. Gwendolyn Bear. Contacted by [facility] PSR informing that pt being sent to ED. Pt was unable to ambulate at all. Requiring assist of 3-4 people to be moved. Unable to be considered for care with care aide in apartment. Needs further evaluation for cause of this sudden change in ability to walk. Part could be fear post 2 falls, but there is question of the cause of these 2 falls. Neither are related to sudden change in sit to stand, tripping, change in luiza texture, slipping on object of fluid. It is a matter of the legs giving out beneath the patent. Patient was evaluated on Sunday w/lab work and EKG, but no scans to evaluate for fracture or for head trauma despite pt stating she had hit her head when she fell. Pt was not seen for her fall on Monday. Kj Sarmiento, pt son, to inform that pt is headed to [facility] for evaluation and treatment. Explained that writer spoke with pt and she had not moved from her chair since the previous day. Had security bring to MD to evaluation and Dr. Gwendolyn Bear felt pt needed to be seen in ED for scans and determine if there is more going on than originally suspected. Son stated he was in the airport now about to board a plane. He is going to be on a multi flight in order to get here to [place] to see his mother.  Informed that his mother was in quite a state to please not be shocked when he sees her due to how long she had not moved from that chair. Nick Greenwood states he has prepared himself for the worst. Suggested that when he lands, to tell the taxi drive to take to [address], all taxis should know where it is located. Writer asked if Nick Greenwood wished to have the [employer] come in to clean mother's apartment considering the state it may be in. He said that would be a good idea. He will likely be staying there tonight. Informed writer would call there to make the arrangements so that would be one less thing for him to worry about. Writer asked if small updates would be allowed to be left on VM while he was in flight and Nick Greenwood agreed this was acceptable. Kit 17 and left VM on Housekeeping line that pt's son Nick Greenwood has given consent for cleaning of the room and would be staying there tonight. Called and spoke to Becka Seth at [address] [place] [address] to inform pt was at the ER. Also informed that son Nick Greenwood was boarding his plane to come to [facility] to check on mother at around 3pm. Explained that he wished to have mother's apartment cleaned up prior to his arrival and that writer had left a voicemail. Asked that she follow up with the housekeeping personnel to assure this was taken care of prior to his arrival as writer was sure the apartment would not be in good condition since pt sat in a chair and was incontinent for nearly 20 hours. Becka Seth assured she would get in touch with them. VM received from [facility] CM Dayton Flores. Informing of pt's arrival in the ED. Left phone number to communicate re: pt.    Returned call to Dayton Flores. Informed and updated regarding information known about patient and about son Nick Greenwood coming in tonight. Informed his number was correct in the chart and should be coming straight over from the airport upon landing. She informed that pt's urine and lab work appeared within normal limits. Pt has had a scan completed but it has not be read as of yet.  PT has not been able to see due to pt being in scan. Unsure if PT will be able to assess tonight. Unsure if pt will be held for admission tonight. Informed that writer understood PT may not see for assessment, but to be aware pt is from independent living and lives 'alone' and required several people in order to be moved into her chair. Also, that the pt sat in her chair for approximately 20 hours without food and drink and was incontinent in that chair for that time frame. That would not be considered a safe discharge environment. Although not hoping to see something on the scans, there is hope to find answers to the cause of these problems the patient is having. Also noted that the patient's son stated that VM messages for updates that he will need to know upon landing were okay. Sergio Tk voiced appreciation for the  was able to share. Call ended. Writer will continue to follow with patient's progress/treatment/care. Writer contacted by Pratik Lopez and was informed that pt had canceled housekeeping several times in the recent past. Pratik Lopez gave housekeeping the go ahead to proceed and clean the apartment since the son is coming into town and staying the night and had given the thumbs up to preparing the apartment for his arrival. They will do their best to treat the chair the patient had been sitting in and incontinent in so the odor will be controlled. Called son Lucía Wells to inform that mother has had CT scan completed and that writer has informed the ED staff he is in transit and will come to the hospital upon arrival to [place]. Also stated that Stacie Lau is unsure if they will admit patient to the hospital for a full stay, but that [facility]ie Lau has informed that she is not safe to return to her home environment. Lucía Wells informs he is about to board his flight in [facility] now and will hopefully arrive soon. Wished a safe flight and informed would speak with him tomorrow regarding mother's prognosis.

## 2017-05-02 NOTE — ED TRIAGE NOTES
Patient has had 2 falls in the last 2 days. Treated here on Sunday for 1st fall, and fell again yesterday with no treatment. Patient arrived today via EMS with inability to stand with bilateral leg pain. Patient was unable to get up to bathroom and is soiled with urine upon arrival. Independent living at [facility]. MD office said she will need higher level of care if discharged.

## 2017-05-02 NOTE — PROGRESS NOTES
Care Management ED Assessment    **Senior Services Case Management Consult**    Tong Hood is a 67 y.o. female presenting via EMS from [employer] [facility] independent living, she had been sitting on a chair since yesterday around noon. Noted she had ED Visit for similar problem on 4/30/17. ED workup:   Labs, EKG, CT of spine. Verified face sheet and demographics. Physical Therapy:  Senior services attempted to work with patient, she was taken to CT for scan. PCP:  Dr. Remy Corners Management Interventions  PCP Verified by CM: Yes (Dr. Latricia Marquis)  Last Visit to PCP: 05/02/17  Transition of Care Consult (CM Consult): Discharge Planning (CM consult:  Senior Services Case Management and Senior Services Physical Therapy )  MyChart Signup: No  Discharge Durable Medical Equipment:  (Rollator )  Health Maintenance Reviewed: Yes  Physical Therapy Consult: Yes (Senior Services Physical Therapy Consult)  Occupational Therapy Consult: No  Speech Therapy Consult: No  Current Support Network: Lives Alone (Lives in [place] at [facility], [facility] )  Plan discussed with Pt/Family/Caregiver: Yes (Met with Ms. Robyn Mueller, discussed discharge planning, discussed concerns about driving if she is unable to get and out of car.  )     Nissa Vivar RN, BSN, [facility]  ED Care Management  [phone]    CM Update    Senior Services Physical Therapy worked with patient, she uses rollator at baseline. Patient participated in PT eval with encouragement, she was able to stand with rollator. She has a lift chair in her apartment and uses rollator. Medical workup being completed. Patient will likely need increased level of care, possibly assisted living if she is unable to walk.       Nissa Vivar RN, BSN, [facility]  ED Care Management  [phone]

## 2017-05-02 NOTE — MR AVS SNAPSHOT
Visit Information Date & Time Provider Department Dept. Phone Encounter #  
 5/2/2017  1:30 PM Holly Corea MD [facility] Internal Medicine [facility] [phone] [id number] Your Appointments 8/1/2017 11:30 AM  
ROUTINE CARE with Hloly Corea MD  
Kami (May Hernandez) Appt Note: 4 month follow up [address] [place] [id number] [phone]  
  
   
 [address] [place] [address]. [address] [place] [id number] Upcoming Health Maintenance Date Due DTaP/Tdap/Td series (1 - Tdap) 6/17/1960 ZOSTER VACCINE AGE 60> 6/17/1999 GLAUCOMA SCREENING Q2Y 6/17/2004 OSTEOPOROSIS SCREENING (DEXA) 6/17/2004 Pneumococcal 65+ Low/Medium Risk (1 of 2 - PCV13) 6/17/2004 MEDICARE YEARLY EXAM 6/17/2004 Allergies as of 5/2/2017  Review Complete On: 5/2/2017 By: Holly Corea MD  
 No Known Allergies Current Immunizations  Never Reviewed No immunizations on file. Not reviewed this visit You Were Diagnosed With   
  
 Codes Comments Weakness of both legs    -  Primary ICD-10-CM: R29.898 ICD-9-CM: 729.89 Gait instability     ICD-10-CM: R26.81 
ICD-9-CM: 306. 2 Chronic pain of right lower extremity     ICD-10-CM: M79.604, G89.29 ICD-9-CM: 729.5, 338.29 Coronary artery disease due to lipid rich plaque     ICD-10-CM: I25.10, I25.83 ICD-9-CM: 414.00, 414.3 Morbid obesity, unspecified obesity type     ICD-10-CM: E66.01 
ICD-9-CM: 278.01 Vitals BP Pulse Temp Resp Height(growth percentile) SpO2  
 124/64 (BP 1 Location: Left arm, BP Patient Position: Sitting) 74 98.2 °F (36.8 °C) (Oral) 20 5' 4\" (1.626 m) 98% OB Status Smoking Status Postmenopausal Never Smoker Preferred Pharmacy Pharmacy Name Phone [facility]. [phone] Your Updated Medication List  
  
   
 This list is accurate as of: 5/2/17  2:34 PM.  Always use your most recent med list.  
  
  
  
  
 aspirin 500 mg tablet Take 500 mg by mouth daily. clopidogrel 75 mg Tab Commonly known as:  PLAVIX TAKE 1 TABLET BY MOUTH EVERY DAY. diclofenac 1 % Gel Commonly known as:  VOLTAREN Apply 4 g to affected area three (3) times daily as needed. HYDROcodone-acetaminophen 5-325 mg per tablet Commonly known as:  Elsie Safe Take 1 Tab by mouth two (2) times daily as needed for Pain. Max Daily Amount: 2 Tabs. levothyroxine 100 mcg tablet Commonly known as:  SYNTHROID  
TAKE 1 TABLET BY MOUTH EVERY MORNING. NexIUM 40 mg capsule Generic drug:  esomeprazole TAKE 1 CAPSULE BY MOUTH EVERY DAY. zolpidem 5 mg tablet Commonly known as:  AMBIEN  
1 tab po HS PRN To-Do List   
 05/05/2017 Imaging:  XR SPINE LUMB 2 OR 3 V Introducing [facility]! Anton Can introduces [employer] patient portal. Now you can access parts of your medical record, email your doctor's office, and request medication refills online. 1. In your internet browser, go to [email or web address] 2. Click on the First Time User? Click Here link in the Sign In box. You will see the New Member Sign Up page. 3. Enter your [employer] Access Code exactly as it appears below. You will not need to use this code after youve completed the sign-up process. If you do not sign up before the expiration date, you must request a new code. · [employer] Access Code: [email or web address] Expires: 7/3/2017 12:31 PM 
 
4. Enter the last four digits of your Social Security Number (xxxx) and Date of Birth (mm/dd/yyyy) as indicated and click Submit. You will be taken to the next sign-up page. 5. Create a [employer] ID. This will be your [employer] login ID and cannot be changed, so think of one that is secure and easy to remember. 6. Create a [employer] password. You can change your password at any time. 7. Enter your Password Reset Question and Answer. This can be used at a later time if you forget your password. 8. Enter your e-mail address. You will receive e-mail notification when new information is available in [address] 9. Click Sign Up. You can now view and download portions of your medical record. 10. Click the Download Summary menu link to download a portable copy of your medical information. If you have questions, please visit the Frequently Asked Questions section of the [employer] website. Remember, [employer] is NOT to be used for urgent needs. For medical emergencies, dial 911. Now available from your iPhone and Android! Please provide this summary of care documentation to your next provider. Your primary care clinician is listed as Wendy Goldsmith. If you have any questions after today's visit, please call [phone].

## 2017-05-03 ENCOUNTER — APPOINTMENT (OUTPATIENT)
Dept: MRI IMAGING | Age: 77
DRG: 552 | End: 2017-05-03
Attending: FAMILY MEDICINE
Payer: MEDICARE

## 2017-05-03 LAB
ATRIAL RATE: 71 BPM
CALCULATED P AXIS, ECG09: 44 DEGREES
CALCULATED R AXIS, ECG10: 22 DEGREES
CALCULATED T AXIS, ECG11: 28 DEGREES
DIAGNOSIS, 93000: NORMAL
LACTATE SERPL-SCNC: 0.9 MMOL/L (ref 0.4–2)
P-R INTERVAL, ECG05: 204 MS
Q-T INTERVAL, ECG07: 386 MS
QRS DURATION, ECG06: 78 MS
QTC CALCULATION (BEZET), ECG08: 419 MS
TROPONIN I SERPL-MCNC: 0.13 NG/ML
VENTRICULAR RATE, ECG03: 71 BPM

## 2017-05-03 PROCEDURE — 93306 TTE W/DOPPLER COMPLETE: CPT

## 2017-05-03 PROCEDURE — 72148 MRI LUMBAR SPINE W/O DYE: CPT

## 2017-05-03 PROCEDURE — 83605 ASSAY OF LACTIC ACID: CPT | Performed by: FAMILY MEDICINE

## 2017-05-03 PROCEDURE — 74011000258 HC RX REV CODE- 258: Performed by: FAMILY MEDICINE

## 2017-05-03 PROCEDURE — 74011250637 HC RX REV CODE- 250/637: Performed by: FAMILY MEDICINE

## 2017-05-03 PROCEDURE — 99218 HC RM OBSERVATION: CPT

## 2017-05-03 PROCEDURE — 84484 ASSAY OF TROPONIN QUANT: CPT | Performed by: FAMILY MEDICINE

## 2017-05-03 PROCEDURE — 74011250636 HC RX REV CODE- 250/636: Performed by: FAMILY MEDICINE

## 2017-05-03 PROCEDURE — 36415 COLL VENOUS BLD VENIPUNCTURE: CPT | Performed by: FAMILY MEDICINE

## 2017-05-03 RX ADMIN — Medication 10 ML: at 22:08

## 2017-05-03 RX ADMIN — Medication 10 ML: at 06:43

## 2017-05-03 RX ADMIN — ASPIRIN 325 MG: 325 TABLET ORAL at 17:00

## 2017-05-03 RX ADMIN — ACETAMINOPHEN 650 MG: 325 TABLET, FILM COATED ORAL at 00:27

## 2017-05-03 RX ADMIN — ZOLPIDEM TARTRATE 5 MG: 5 TABLET ORAL at 22:08

## 2017-05-03 RX ADMIN — CLOPIDOGREL BISULFATE 75 MG: 75 TABLET ORAL at 22:09

## 2017-05-03 RX ADMIN — Medication 10 ML: at 14:44

## 2017-05-03 RX ADMIN — PANTOPRAZOLE SODIUM 40 MG: 40 TABLET, DELAYED RELEASE ORAL at 10:53

## 2017-05-03 RX ADMIN — LEVOTHYROXINE SODIUM 100 MCG: 150 TABLET ORAL at 06:43

## 2017-05-03 RX ADMIN — CEFTRIAXONE 1 G: 1 INJECTION, POWDER, FOR SOLUTION INTRAMUSCULAR; INTRAVENOUS at 22:08

## 2017-05-03 RX ADMIN — ASPIRIN 325 MG: 325 TABLET ORAL at 22:09

## 2017-05-03 RX ADMIN — HEPARIN SODIUM 5000 UNITS: 5000 INJECTION, SOLUTION INTRAVENOUS; SUBCUTANEOUS at 10:53

## 2017-05-03 RX ADMIN — HEPARIN SODIUM 5000 UNITS: 5000 INJECTION, SOLUTION INTRAVENOUS; SUBCUTANEOUS at 22:08

## 2017-05-03 NOTE — PROGRESS NOTES
Primary Nurse Sulaiman Mcgovern and Carlos Gillis, RN performed a dual skin assessment on this patient No impairment noted  Amaury score is 17

## 2017-05-03 NOTE — PROGRESS NOTES
Neurosurgery Progress Note    Victor Manuel Phan, [facility]  [phone]    Admit Date: 2017   LOS: 0 days        Daily Progress Note: 5/3/2017      Subjective: The patient has a history of chronic leg pain. She recently had 2 falls, where she felt like her knees gave out from her. She says she cannot walk more than a few steps without having pain in her legs. She has had leg pain ever since her bilateral hip replacements, one in  and the other in . She says she has had to order her groceries online because she has not felt up to venturing out due to pain. Apparently, she was in her kitchen yesterday and her leg gave out when she bent down to pick something up. She then reports that she went to get her car from the [address] at the [facility], and she was not able to get into her car without the assistance of 4 people. She drove back to [facility], where security helped get her from her car to a chair in her room. She was found the next morning soiled in the chair because she was unable to get up because she felt like her legs were mush. The patient was found to have rhabdomyolysis and a UTI. An MRI of her back showed lumbar stenosis. We were asked to evaluate the patient for this reason. She is currently on ASA and Plavix because of 4 stents in her heart. She is sitting in the bed, playing scrabble with her son at the bedside. Denies numbness, tingling, chest pain,  nausea, vomiting, difficulty swallowing, headache, and dyspnea.        Objective:     Vital signs  Temp (24hrs), Av.2 °F (36.8 °C), Min:97.8 °F (36.6 °C), Max:98.8 °F (37.1 °C)           Visit Vitals    /46 (BP 1 Location: Left arm, BP Patient Position: At rest)    Pulse 75    Temp 98 °F (36.7 °C)    Resp 19    Ht 5' 4\" (1.626 m)    Wt 112 kg (246 lb 14.6 oz)    SpO2 96%    Breastfeeding No    BMI 42.38 kg/m2      O2 Device: Room air     Pain control  Pain Assessment  Pain Scale 1: Numeric (0 - 10)  Pain Intensity 1: 0  Pain Onset 1: post fall  Pain Location 1: Leg  Pain Orientation 1: Left  Pain Description 1: Aching  Pain Intervention(s) 1: Medication (see MAR), Rest, Repositioned    PT/OT  Gait     Gait  Base of Support: Widened  Speed/Mere: Shuffled, Slow  Step Length: Right shortened, Left shortened  Gait Abnormalities: Decreased step clearance, Shuffling gait  Ambulation - Level of Assistance: Contact guard assistance  Distance (ft): 4 Feet (ft)  Assistive Device: Walker, rollator, Gait belt           Physical Exam:  Gen:NAD. Neuro: A&Ox3. Follows commands. Speech clear. Affect normal.  PERRL.   DIETRICH. Strength 5/5 in UE BL. Unable to lift bilateral heels off of the bed. Cannot pull her legs towards her chest. No pain in back with straight leg raise test. Upgoing toe on the right. Sensation intact bilaterally to light touch. Gait deferred. MRI L-spine without contrast on 05/03/17 shows L3-4 moderate-severe central spinal canal stenosis and moderate bilateral foraminal stenosis. L4-5 superimposed right central disc protrusion may affect the descending right L5 nerve as well as the right L4 nerve. Moderate central spinal canal stenosis at L4-5. Multilevel 1 mm grade 1 retrolisthesis and facet arthrosis. 24 hour results:    Recent Results (from the past 24 hour(s))   TROPONIN I    Collection Time: 05/02/17  7:51 PM   Result Value Ref Range    Troponin-I, Qt. 0.19 (H) <0.05 ng/mL   TROPONIN I    Collection Time: 05/03/17  3:30 AM   Result Value Ref Range    Troponin-I, Qt. 0.13 (H) <0.05 ng/mL   LACTIC ACID, PLASMA    Collection Time: 05/03/17  3:30 AM   Result Value Ref Range    Lactic acid 0.9 0.4 - 2.0 MMOL/L          Assessment:     Principal Problem:    Weakness (5/2/2017)        Plan:   1. Lumbar stenosis   - No severe central stenosis. Main complaint seems to be knee pain. Is her knee arthritis playing into this?   - Dr. Gris Ames to see pt. She may need an MRI of her C-spine pending his eval   2.  CAD   - On ASA and Plavix   - Has 4 cardiac stents  3. Rhabdomyolysis   - Hospitalist following  4. UTI (POA)   - Hospitalist following    Plan d/w Dr. Hellen Ball. He will see the patient and make further recs.       Kymberly Buenrostro NP

## 2017-05-03 NOTE — PROGRESS NOTES
Hospitalist Progress Note         Elvi Weldon MD  Call physician on-call through the  7pm-7am    Daily Progress Note: 5/3/2017    Assessment/Plan:   1. Multiple falls and lower extremity weakness: possibly mutlifactorial with lumbar stenosis, now UTI. MRI of the lumbar spine with   1. L3-4 moderate-severe central spinal canal stenosis and moderate bilateral foraminal stenosis. 2. L4-5 superimposed right central disc protrusion may affect the descending  right L5 nerve as well as the right L4 nerve. Moderate central spinal canal  stenosis at L4-5.  3. Multilevel 1 mm grade 1 retrolisthesis and facet arthrosis. . Will consult with NS for further recs. 2.GNR UTI:  Now on IV rocephin. Final cx pending. 3.  Elevated troponin: trending up. On ASA and plavix. ECHO pending. Cardiology consulted. 4. Mild rhabdomyolysis:  On IVF   5. Recent fall with left knee pain: x-ray  left knee neg for fracture, shows OA. 6. DVT prophylaxis: on sq heparin       Spoke with her and her son. Code status: FULL    Subjective:   . No c/o SOB/CP/N/V    Review of Systems:     As in subjective    Objective:   Physical Exam:     Visit Vitals    /51 (BP 1 Location: Left arm, BP Patient Position: At rest)    Pulse 62    Temp 97.9 °F (36.6 °C)    Resp 16    Ht 5' 4\" (1.626 m)    Wt 112 kg (246 lb 14.6 oz)    SpO2 98%    Breastfeeding No    BMI 42.38 kg/m2      O2 Device: Room air    Temp (24hrs), Av.1 °F (36.7 °C), Min:97.5 °F (36.4 °C), Max:98.8 °F (37.1 °C)             General:  Alert, cooperative, no distress, appears stated age. Lungs:   Clear to auscultation bilaterally. Chest wall:  No tenderness or deformity. Heart:  Regular rate and rhythm, S1, S2 normal, no murmur, click, rub or gallop. Abdomen:   Soft, non-tender. Bowel sounds normal. No masses,  No organomegaly. Extremities: Extremities normal, atraumatic, no cyanosis or edema.    Pulses: 2+ and symmetric all extremities. Skin: Skin color, texture, turgor normal. No rashes or lesions   Neurologic: CNII-XII intact. Data Review:       Recent Days:  Recent Labs      05/02/17   1521  04/30/17   2242   WBC  8.1  11.0   HGB  12.9  14.1   HCT  39.9  43.6   PLT  278  335     Recent Labs      05/02/17   1521  04/30/17   2242   NA  141  143   K  3.4*  4.6   CL  107  110*   CO2  25  26   GLU  102*  113*   BUN  14  20   CREA  0.89  0.92   CA  8.3*  9.1   ALB  3.4*  3.8   SGOT  28  18   ALT  22  21     No results for input(s): PH, PCO2, PO2, HCO3, FIO2 in the last 72 hours. 24 Hour Results:  Recent Results (from the past 24 hour(s))   URINALYSIS W/MICROSCOPIC    Collection Time: 05/02/17  3:21 PM   Result Value Ref Range    Color DARK YELLOW      Appearance CLOUDY (A) CLEAR      Specific gravity >1.030 (H) 1.003 - 1.030    pH (UA) 5.5 5.0 - 8.0      Protein TRACE (A) NEG mg/dL    Glucose NEGATIVE  NEG mg/dL    Ketone NEGATIVE  NEG mg/dL    Blood SMALL (A) NEG      Urobilinogen 1.0 0.2 - 1.0 EU/dL    Nitrites NEGATIVE  NEG      Leukocyte Esterase MODERATE (A) NEG      WBC >100 (H) 0 - 4 /hpf    RBC 0-5 0 - 5 /hpf    Epithelial cells MODERATE (A) FEW /lpf    Bacteria NEGATIVE  NEG /hpf    Mucus 1+ (A) NEG /lpf   CBC WITH AUTOMATED DIFF    Collection Time: 05/02/17  3:21 PM   Result Value Ref Range    WBC 8.1 3.6 - 11.0 K/uL    RBC 4.17 3.80 - 5.20 M/uL    HGB 12.9 11.5 - 16.0 g/dL    HCT 39.9 35.0 - 47.0 %    MCV 95.7 80.0 - 99.0 FL    MCH 30.9 26.0 - 34.0 PG    MCHC 32.3 30.0 - 36.5 g/dL    RDW 14.1 11.5 - 14.5 %    PLATELET 175 362 - 033 K/uL    NEUTROPHILS 63 32 - 75 %    LYMPHOCYTES 23 12 - 49 %    MONOCYTES 11 5 - 13 %    EOSINOPHILS 2 0 - 7 %    BASOPHILS 1 0 - 1 %    ABS. NEUTROPHILS 5.1 1.8 - 8.0 K/UL    ABS. LYMPHOCYTES 1.9 0.8 - 3.5 K/UL    ABS. MONOCYTES 0.9 0.0 - 1.0 K/UL    ABS. EOSINOPHILS 0.2 0.0 - 0.4 K/UL    ABS.  BASOPHILS 0.1 0.0 - 0.1 K/UL   METABOLIC PANEL, COMPREHENSIVE    Collection Time: 05/02/17 3:21 PM   Result Value Ref Range    Sodium 141 136 - 145 mmol/L    Potassium 3.4 (L) 3.5 - 5.1 mmol/L    Chloride 107 97 - 108 mmol/L    CO2 25 21 - 32 mmol/L    Anion gap 9 5 - 15 mmol/L    Glucose 102 (H) 65 - 100 mg/dL    BUN 14 6 - 20 MG/DL    Creatinine 0.89 0.55 - 1.02 MG/DL    BUN/Creatinine ratio 16 12 - 20      GFR est AA >60 >60 ml/min/1.73m2    GFR est non-AA >60 >60 ml/min/1.73m2    Calcium 8.3 (L) 8.5 - 10.1 MG/DL    Bilirubin, total 0.4 0.2 - 1.0 MG/DL    ALT (SGPT) 22 12 - 78 U/L    AST (SGOT) 28 15 - 37 U/L    Alk. phosphatase 64 45 - 117 U/L    Protein, total 7.2 6.4 - 8.2 g/dL    Albumin 3.4 (L) 3.5 - 5.0 g/dL    Globulin 3.8 2.0 - 4.0 g/dL    A-G Ratio 0.9 (L) 1.1 - 2.2     TROPONIN I    Collection Time: 05/02/17  3:21 PM   Result Value Ref Range    Troponin-I, Qt. 0.17 (H) <0.05 ng/mL   BILIRUBIN, CONFIRM    Collection Time: 05/02/17  3:21 PM   Result Value Ref Range    Bilirubin UA, confirm NEGATIVE  NEG     CK W/ REFLX CKMB    Collection Time: 05/02/17  3:21 PM   Result Value Ref Range     (H) 26 - 192 U/L   CULTURE, URINE    Collection Time: 05/02/17  3:21 PM   Result Value Ref Range    Special Requests: NO SPECIAL REQUESTS      [place] Count 01740  COLONIES/mL        Culture result: GRAM NEGATIVE RODS (A)     CK-MB,QUANT.     Collection Time: 05/02/17  3:21 PM   Result Value Ref Range    CK - MB 2.7 <3.6 NG/ML    CK-MB Index 0.6 0 - 2.5     EKG, 12 LEAD, INITIAL    Collection Time: 05/02/17  3:24 PM   Result Value Ref Range    Ventricular Rate 71 BPM    Atrial Rate 71 BPM    P-R Interval 204 ms    QRS Duration 78 ms    Q-T Interval 386 ms    QTC Calculation (Bezet) 419 ms    Calculated P Axis 44 degrees    Calculated R Axis 22 degrees    Calculated T Axis 28 degrees    Diagnosis       Normal sinus rhythm with sinus arrhythmia  Nonspecific ST abnormality  When compared with ECG of 30-APR-2017 22:27,  No significant change was found  Confirmed by Kelsie Ahumada M.D., Irena Tavera ([id number]) on 5/3/2017 6:03:43 AM     TROPONIN I    Collection Time: 05/02/17  7:51 PM   Result Value Ref Range    Troponin-I, Qt. 0.19 (H) <0.05 ng/mL   TROPONIN I    Collection Time: 05/03/17  3:30 AM   Result Value Ref Range    Troponin-I, Qt. 0.13 (H) <0.05 ng/mL   LACTIC ACID, PLASMA    Collection Time: 05/03/17  3:30 AM   Result Value Ref Range    Lactic acid 0.9 0.4 - 2.0 MMOL/L       Problem List:  Problem List as of 5/3/2017  Date Reviewed: 5/2/2017          Codes Class Noted - Resolved    * (Principal)Weakness ICD-10-CM: R53.1  ICD-9-CM: 780.79  5/2/2017 - Present        Chronic insomnia ICD-10-CM: F51.04  ICD-9-CM: 780.52  10/20/2015 - Present        Chronic leg pain ICD-10-CM: M79.606, G89.29  ICD-9-CM: 729.5, 338.29  1/22/2015 - Present        AR (allergic rhinitis) ICD-10-CM: J30.9  ICD-9-CM: 477.9  7/10/2014 - Present        Obesity ICD-10-CM: E66.9  ICD-9-CM: 278.00  5/15/2014 - Present        Constipation ICD-10-CM: K59.00  ICD-9-CM: 564.00  3/15/2012 - Present        S/P angioplasty with stent ICD-10-CM: Z95.9  ICD-9-CM: V45.89  6/30/2011 - Present        Obesity, unspecified ICD-10-CM: E66.9  ICD-9-CM: 278.00  2/4/2010 - Present        Hypothyroidism ICD-10-CM: E03.9  ICD-9-CM: 244.9  2/1/2010 - Present        Pure hypercholesterolemia ICD-10-CM: E78.00  ICD-9-CM: 272.0  2/1/2010 - Present        Reflux esophagitis ICD-10-CM: K21.0  ICD-9-CM: 530.11  2/1/2010 - Present        CAD (coronary artery disease) ICD-10-CM: I25.10  ICD-9-CM: 414.00  2/1/2010 - Present        Insomnia ICD-10-CM: G47.00  ICD-9-CM: 780.52  2/1/2010 - Present        Depressive disorder, not elsewhere classified ICD-10-CM: F32.9  ICD-9-CM: 752  2/1/2010 - Present        Venous insufficiency ICD-10-CM: I87.2  ICD-9-CM: 459.81  2/1/2010 - Present              Medications reviewed  Current Facility-Administered Medications   Medication Dose Route Frequency    aspirin (ASPIRIN) tablet 325 mg  325 mg Oral TID    levothyroxine (SYNTHROID) tablet 100 mcg  100 mcg Oral ACB    pantoprazole (PROTONIX) tablet 40 mg  40 mg Oral DAILY    zolpidem (AMBIEN) tablet 5 mg  5 mg Oral QHS PRN    sodium chloride (NS) flush 5-10 mL  5-10 mL IntraVENous Q8H    sodium chloride (NS) flush 5-10 mL  5-10 mL IntraVENous PRN    0.9% sodium chloride infusion  75 mL/hr IntraVENous CONTINUOUS    cefTRIAXone (ROCEPHIN) 1 g in 0.9% sodium chloride (MBP/ADV) 50 mL  1 g IntraVENous Q24H    acetaminophen (TYLENOL) tablet 650 mg  650 mg Oral Q4H PRN    heparin (porcine) injection 5,000 Units  5,000 Units SubCUTAneous Q12H    clopidogrel (PLAVIX) tablet 75 mg  75 mg Oral DAILY       Care Plan discussed with: Patient/Family, Nurse and     Total time spent with patient: 30 minutes.     Dante Casillas MD

## 2017-05-03 NOTE — PROGRESS NOTES
Bedside shift change report given to Bere Can RN (oncoming nurse) by Mariella Cain RN (offgoing nurse). Report included the following information SBAR, Kardex, ED Summary, Procedure Summary, Intake/Output, MAR, Accordion, Recent Results, Med Rec Status and Cardiac Rhythm Sinus Beck Weinstein.

## 2017-05-03 NOTE — PROCEDURES
[address]  *** FINAL REPORT ***    Name: Darlin Fernando  MRN: [id number]    Outpatient  : 1940  HIS Order #: [id number]  [address] [facility] Visit #: [id number]  Date: 02 May 2017    TYPE OF TEST: Peripheral Venous Testing    REASON FOR TEST  R/O DVT    Right Leg:-  Deep venous thrombosis:           No  Superficial venous thrombosis:    No  Deep venous insufficiency:        Not examined  Superficial venous insufficiency: Not examined    Left Leg:-  Deep venous thrombosis:           No  Superficial venous thrombosis:    No  Deep venous insufficiency:        Not examined  Superficial venous insufficiency: Not examined      INTERPRETATION/FINDINGS  PROCEDURE:  Color duplex ultrasound imaging of lower extremity veins. FINDINGS:       Right: The common femoral, deep femoral, femoral, popliteal,  posterior tibial, peroneal, and great saphenous are patent and without   evidence of thrombus;  each is fully compressible and there is no  narrowing of the flow channel on color Doppler imaging. Phasic flow  is observed in the common femoral vein. Left:   The common femoral, deep femoral, femoral, popliteal,  posterior tibial, peroneal, and great saphenous are patent and without   evidence of thrombus;  each is fully compressible and there is no  narrowing of the flow channel on color Doppler imaging. Phasic flow  is observed in the common femoral vein. IMPRESSION:  No evidence of right or left lower extremity vein  thrombosis. ADDITIONAL COMMENTS    I have personally reviewed the data relevant to the interpretation of  this  study.     TECHNOLOGIST: Danica Shay RVT  Signed: 2017 08:45 PM    PHYSICIAN: Zeeshan Lennon MD  Signed: 2017 11:41 AM

## 2017-05-03 NOTE — H&P
[address] [place] [facility] [address]   HISTORY AND PHYSICAL       Name:  Rylee Zhao   MR#:  [id number]   :  1940   Account #:  [de-identified]        Date of Adm:  2017       ATTENDING PHYSICIAN: Ally De La O MD    CHIEF COMPLAINT: Falls and weakness in her legs. HISTORY OF PRESENT ILLNESS: The patient is a 48-year-old   female with past medical history of bilateral hip replacement, history of   gait instability, coronary artery disease, morbid obesity, who presents   to the hospital with the above mentioned symptoms. The patient   reports that she lives at [facility] assisted living facility, has   chronic gait instability, chronic edema of her legs and trouble walking. The patient reports that she was at her baseline yesterday where she   walks a few steps with the help of a walker, but had a fall and felt that   her legs gave away. She reports that she scraped her left knee. The   patient reports that yesterday she went to a spa treatment. She   finished the treatment and  brought her car in the [facility],   but she was not able to get in the car. It took 4 people to put her in the   car and then she drove to [facility]. She called security who   picked her up from the car and put her in a chair in her room. Since   yesterday, the patient had been sitting on the chair until this morning   when she was found by the nurse navigator, which was approximately   for 15-20 hours. The patient had urinated in the chair and had soiled   herself. The patient reports that she could not move because she felt   her legs were \"mush. \" The patient went to see her primary care   physician and was referred to the ER for further management and   evaluation. The patient reports that she had another fall, but did not   hurt herself anywhere. The patient was found to have a UTI and was   requested to be admitted under the hospitalist service.  The patient denies any other complaints or problems. Denies any chest pain,   shortness of breath, cough, fever, chills. Denies any headache, blurry   vision, sore throat, trouble swallowing, trouble with speech, any   nausea, vomiting, fever, chills, hematemesis, melena, hemoptysis,   constipation, diarrhea, recent travels or any other concerns or   problems. PAST MEDICAL HISTORY: See above. HOME MEDICATIONS: Currently, the patient is on:   1. Aspirin 325 mg daily. 2. Ambien 5 mg daily. 3. Nexium 40 mg daily. 4. Plavix 75 mg daily. 5. Levothyroxine 100 mcg daily. SOCIAL HISTORY: Denies alcohol, tobacco, IV drug abuse. Lives at   home. REVIEW OF SYSTEMS: A 10-point review of systems was done which   was essentially negative, except for the symptoms mentioned above. ALLERGIES: NO KNOWN DRUG ALLERGIES. FAMILY HISTORY: Was discussed, was found to be noncontributory. PHYSICAL EXAMINATION   VITALS: Temperature 98.8, pulse 67, respiratory rate 15, blood   pressure 156/67, pulse oximetry 95% on room air. GENERAL: Alert x3, awake, mildly distressed, pleasant female,   appears to be stated age. HEENT: Pupils equal and reactive to light. Dry mucous membranes. NECK: Supple. CHEST: Clear to auscultation bilaterally. CORONARY: S1, S2 were heard. ABDOMEN: Soft, nontender, nondistended. Bowel sounds are   physiologic. EXTREMITIES: No clubbing, no cyanosis. There is pain as well as   bruising on the left knee, chronic 1+ edema. NEUROPSYCHIATRIC: Pleasant mood and affect. Sensory decreased   bilateral lower extremity, 2/5 strength bilateral lower extremities, 5/5   strength bilateral upper extremities. Cranial nerves II through XII   grossly intact. SKIN: Warm. LABORATORY DATA: White count 8.1, hemoglobin 12.9, hematocrit   39.9, platelets 384. Urine shows greater than 100 WBC, moderate   epithelial cells, moderate leukocyte esterase.  Sodium 141, potassium   3.4, chloride 107, bicarbonate 25, glucose 102, BUN 14, creatinine 16,   calcium 8.3, bilirubin total 7.2, albumin 3.4, ALT 24, AST 28. ,   CK-MB 2.7, troponin 0.17. CT of the lumbar spine shows degenerative   changes with spinal stenosis L3-L4 and L4-L5. EKG shows normal   sinus rhythm, nonspecific ST abnormality. ASSESSMENT AND PLAN   1. Multiple falls and lower extremity weakness. Unclear etiology. Could   be multifactorial. The patient appears to have some lumbar spinal   stenosis. Will observe the patient in the hospital. Will get an MRI of the   lumbar spine, neurovascular checks, fall precautions and Physical   Therapy consult. The patient also has a urinary tract infection which   could be contributing to the same, thus we will treat the same and   await urine culture. Will get a CT of the head to rule out any central   pathology and continue to closely monitor. May consider getting a   Neurosurgery consult and further intervention will be per hospital   course. The patient appears to be dehydrated, thus will start the patient   on gentle hydration and further intervention will be per hospital course. 2. Urinary tract infection. Will start the patient on IV antibiotics and   await culture results. Further intervention will be per hospital course. 3. Recent fall with left knee pain. Will get an x-ray of the left knee. 4. Dehydration, mild. Will start the patient on IV hydration and continue   to monitor. 5. Mild rhabdomyolysis. Will start the patient on IV hydration, closely   monitor renal function. Further intervention will be per hospital course. 6. Elevated troponin. The patient denies any symptoms associated   with the same. Denies any chest pain, shortness of breath or   palpitations. Will get an echocardiogram, serial troponins. Continue   aspirin and Plavix. Will continue to monitor. The patient on telemetry. Further intervention will be per hospital course.    7. Gastrointestinal and deep venous thrombosis prophylaxis. The   patient will be on heparin.         Carlos Del Angel MD MM / [place] [facility]   D:  05/02/2017   19:37   T:  05/02/2017   20:13   Job #:  [id number]

## 2017-05-03 NOTE — CONSULTS
Cardiology Consult Note      Patient Name: Damian Eisenmenger  : 1940 MRN: [id number]  Date: 5/3/2017  Time: 10:59 AM    Admit Diagnosis: Falls    Primary Cardiologist: Dr. Saintclair Manger Cardiologist: Ellis Edmond M.D.    Reason for Consult: Elevated troponin    Requesting MD: Juvencio Hall MD    HPI:  Damian Eisenmenger is a 67 y.o. female admitted on 2017  for Falls. Past medical history of Depression; GERD (gastroesophageal reflux disease); Hypercholesterolemia; and Thyroid disease and CAD. Admitted for lower extremity weakness, UTI, dehydration on mild rhabdomyolysis after sitting in a chair in her house for 15-20 hours 2/2 to LE weakness and being unable to stand. Found by NN, patient was taken to the ED for evaluation. H/o CAD with stent placement in the past.  Last saw Dr. Snow Neal in  in follow up and refused any further treatment for her CAD, outside of agreeing to use ASA and Plavix. She does not want cardiac cath because prior cath was \"awful\" and she does not want to endure it again. Subjective:  She denies CP currently or in recent. Note recent CHAVEZ, but perhaps related to current level of debility. Continues to use only her ASA and Plavix. Relates how awful her prior cardiac cath was and does not seem interested in pursuing any further. Assessment and Plan     1. Troponin elevation - to 0.19   - Non specific in this setting of mild dehydration and rhabdomyolysis. - Denies CP   - EKG unchanged from prior, and demonstrates no ACS   - Echo ordered    2. LE weakness   - MRI with spinal stenosis, B/L foraminal stenosis, disc protrusion and retrolisthesis   - Recommend Ortho consult  3. CAD   - 1. CAD- s/p Vision BMS 2.0x8 x2, , on aspirin and plavix, continue  4. Dyslipidemia   - Patient stopped statin therapy  5. UTI   - per Primary team  6. Body mass index is 42.38 kg/(m^2).  - morbid obesity   - patient not interested in diet and exercise. Given level of debility and MRI results, participation in exercise programs would     be extremely limited at best.      Mild elevation in troponin levels, most likely related to mild dehydration, Rhabdo, and background CAD. EKG without ACS and patient denies CP. Echo ordered, will follow up. Patient still resistant to any further CAD treatments or intervention. Needs no further cardiac testing at this time. Dr. Torin Silva to see this patient tomorrow and will defer any further discussion regarding cardiac status and testing to her. Cardiology attending: seen and examined. Agree with assess and plan  Patient says that current gandara is different from symptoms leading up to stent procedure in [place] approx 10 years ago. Last stress test 2013 showed small area of mild ischemia. Review of Symptoms:  A comprehensive review of systems was negative except for that written in the HPI. Previous treatment/evaluation includes Percutaneous Coronary Intervention and persantine thallium .   Cardiac risk factors: dyslipidemia, obesity, sedentary life style, post-menopausal.    Past Medical History:   Diagnosis Date    Depression     GERD (gastroesophageal reflux disease)     Hypercholesterolemia     Thyroid disease      Past Surgical History:   Procedure Laterality Date    HX CORONARY STENT PLACEMENT  2005    x4    TOTAL HIP ARTHROPLASTY      bilateral     Current Facility-Administered Medications   Medication Dose Route Frequency    aspirin (ASPIRIN) tablet 325 mg  325 mg Oral TID    levothyroxine (SYNTHROID) tablet 100 mcg  100 mcg Oral ACB    pantoprazole (PROTONIX) tablet 40 mg  40 mg Oral DAILY    zolpidem (AMBIEN) tablet 5 mg  5 mg Oral QHS PRN    sodium chloride (NS) flush 5-10 mL  5-10 mL IntraVENous Q8H    sodium chloride (NS) flush 5-10 mL  5-10 mL IntraVENous PRN    0.9% sodium chloride infusion  75 mL/hr IntraVENous CONTINUOUS    cefTRIAXone (ROCEPHIN) 1 g in 0.9% sodium chloride (MBP/ADV) 50 mL  1 g IntraVENous Q24H    acetaminophen (TYLENOL) tablet 650 mg  650 mg Oral Q4H PRN    heparin (porcine) injection 5,000 Units  5,000 Units SubCUTAneous Q12H    clopidogrel (PLAVIX) tablet 75 mg  75 mg Oral DAILY       No Known Allergies   Family History   Problem Relation Age of Onset    Kidney Disease Father     Kidney Disease Sister       Social History     Social History    Marital status: UNKNOWN     Spouse name: N/A    Number of children: N/A    Years of education: N/A     Social History Main Topics    Smoking status: Never Smoker    Smokeless tobacco: Never Used    Alcohol use 0.5 oz/week     1 Glasses of wine per week      Comment: Wine daily    Drug use: No    Sexual activity: No     Other Topics Concern    None     Social History Narrative       Objective:    Physical Exam    Vitals:   Vitals:    05/02/17 2115 05/02/17 2300 05/03/17 0300 05/03/17 0700   BP: 146/54 115/56 140/54 130/59   Pulse: 61 68 (!) 56 68   Resp: 19 21 15 20   Temp: 97.8 °F (36.6 °C) 97.9 °F (36.6 °C) 98.4 °F (36.9 °C) 97.9 °F (36.6 °C)   SpO2:  95% 97% 98%   Weight:   112 kg (246 lb 14.6 oz)    Height:   5' 4\" (1.626 m)        General:    Alert, cooperative, no distress, appears stated age. Neck:   Supple, no carotid bruit and no JVD. Back:     Symmetric, normal curvature. Lungs:     Clear to auscultation bilaterally. Heart[de-identified]    Regular rate and rhythm, S1, S2 normal, 1/6 systolic murmur. No click, rub or gallop. Abdomen:     Soft, non-tender. Bowel sounds normal.    Extremities:   Extremities normal, atraumatic, no cyanosis or edema. Vascular:   Pulses - 2+ radials. 2+PT   Skin:   Skin color normal. No rashes or lesions   Neurologic:   CN II-XII grossly intact.         Telemetry: normal sinus rhythm    ECG: normal EKG, normal sinus rhythm, nonspecific ST and T waves changes    Data Review:     Radiology: No acute process on CXR    Recent Labs      05/03/17 0330  05/02/17 1951 05/02/17   1521   TROIQ  0.13*  0.19*  0.17*     Recent Labs      05/02/17   1521  04/30/17   2242   NA  141  143   K  3.4*  4.6   CL  107  110*   CO2  25  26   BUN  14  20   CREA  0.89  0.92   GLU  102*  113*   CA  8.3*  9.1     Recent Labs      05/02/17   1521  04/30/17   2242   WBC  8.1  11.0   HGB  12.9  14.1   HCT  39.9  43.6   PLT  278  335     Recent Labs      05/02/17   1521  04/30/17   2242   SGOT  28  18   AP  64  70     No results for input(s): TGL, CHOL, LDLC in the last 72 hours. No lab exists for component: HDLC,  HBA1C  No results for input(s): CRP, TSH, TSHEXT in the last 72 hours.     No lab exists for component: ESR    Antonio Cabrera PA-C         [facility] [address] [place] [address], [address] [place] [address],[address]     [facility]     [phone]    Christiano Flores MD

## 2017-05-03 NOTE — PROGRESS NOTES
Chart reviewed for transitions of care, discussed patient during rounds, noted initial CM assessment indicating patients' residence at [facility]. Patient has had multiple falls, MRI showing L3-4 moderate-severe central spinal canal stenosis and neurosurgery has been consulted. Cm will be available and assist with discharge needs as appropriate.   [employer] , [place]

## 2017-05-03 NOTE — PROGRESS NOTES
Bedside and Verbal shift change report given to Corey Brewster RN (oncoming nurse) by Scooby Lua RN (offgoing nurse). Report included the following information SBAR, Kardex, Intake/Output, MAR and Recent Results.

## 2017-05-03 NOTE — PROGRESS NOTES
Bedside and Verbal shift change report given to Frank W Cely Lucas (oncoming nurse) by Laurie Olson (offgoing nurse). Report included the following information SBAR, Kardex and Recent Results.

## 2017-05-03 NOTE — ED NOTES
Off floor to vascular. Bedside shift change report given to Danna Bill (oncoming nurse) by Jerardo Velez (offgoing nurse). Report included the following information SBAR.

## 2017-05-03 NOTE — PROGRESS NOTES
Physical Therapy  Orders received and chart reviewed. Spoke with patient to initiate eval and lunch arrived. Patient declining PT until after lunch. Also noted that one MD note states need ortho evaluation. Informed nurse. Will follow up later today as able.   Cholo Issa, PT

## 2017-05-03 NOTE — ROUTINE PROCESS
TRANSFER - OUT REPORT:    Verbal report given to Summer Estrada (name) on Ashley Riser  being transferred to NSTU (unit) for routine progression of care       Report consisted of patients Situation, Background, Assessment and   Recommendations(SBAR). Information from the following report(s) SBAR, ED Summary, [facility] and Recent Results was reviewed with the receiving nurse. Lines:   Peripheral IV 05/02/17 Right Antecubital (Active)   Site Assessment Clean, dry, & intact 5/2/2017  3:31 PM   Phlebitis Assessment 0 5/2/2017  3:31 PM   Infiltration Assessment 0 5/2/2017  3:31 PM   Dressing Status Clean, dry, & intact 5/2/2017  3:31 PM   Dressing Type Transparent 5/2/2017  3:31 PM   Hub Color/Line Status Green 5/2/2017  3:31 PM        Opportunity for questions and clarification was provided.       Patient transported with:   Tech   To be transported upstairs to inpatient room after going to vascular

## 2017-05-03 NOTE — INTERDISCIPLINARY ROUNDS
IDR/SLIDR Summary          Patient: Missy Hoyt MRN: [id number]    Age: 67 y.o. YOB: 1940 Room/Bed: [facility]   Admit Diagnosis: Falls  Principal Diagnosis: Weakness   Goals: safety, NS consult, card consult, MRI, echo  Readmission: NO  Quality Measure: Not applicable  VTE Prophylaxis: Chemical  Influenza Vaccine screening completed? YES  Pneumococcal Vaccine screening completed? NO  Mobility needs: Yes   Nutrition plan:No  Consults: P. T and O.T. Financial concerns:No  Escalated to CM? NO  RRAT Score: 12   Interventions:  Testing due for pt today?  YES  LOS: 0 days Expected length of stay </= 2 days days  Discharge plan: TBD   PCP: Lety Parr MD  Transportation needs: Yes    Days before discharge:two or more days before discharge   Discharge disposition: TBD    Signed:     Carine Meredith RN  5/3/2017  3:35 PM

## 2017-05-04 ENCOUNTER — APPOINTMENT (OUTPATIENT)
Dept: MRI IMAGING | Age: 77
DRG: 552 | End: 2017-05-04
Attending: NURSE PRACTITIONER
Payer: MEDICARE

## 2017-05-04 PROBLEM — N39.0 UTI (URINARY TRACT INFECTION): Status: ACTIVE | Noted: 2017-05-04

## 2017-05-04 LAB
BACTERIA SPEC CULT: ABNORMAL
CC UR VC: ABNORMAL
SERVICE CMNT-IMP: ABNORMAL

## 2017-05-04 PROCEDURE — 65660000000 HC RM CCU STEPDOWN

## 2017-05-04 PROCEDURE — 74011250636 HC RX REV CODE- 250/636: Performed by: NURSE PRACTITIONER

## 2017-05-04 PROCEDURE — 74011000258 HC RX REV CODE- 258: Performed by: FAMILY MEDICINE

## 2017-05-04 PROCEDURE — 72146 MRI CHEST SPINE W/O DYE: CPT

## 2017-05-04 PROCEDURE — 74011250636 HC RX REV CODE- 250/636: Performed by: FAMILY MEDICINE

## 2017-05-04 PROCEDURE — 72141 MRI NECK SPINE W/O DYE: CPT

## 2017-05-04 PROCEDURE — 74011250637 HC RX REV CODE- 250/637: Performed by: FAMILY MEDICINE

## 2017-05-04 PROCEDURE — 99218 HC RM OBSERVATION: CPT

## 2017-05-04 RX ORDER — LORAZEPAM 2 MG/ML
0.5 INJECTION INTRAMUSCULAR
Status: COMPLETED | OUTPATIENT
Start: 2017-05-04 | End: 2017-05-04

## 2017-05-04 RX ADMIN — ASPIRIN 325 MG: 325 TABLET ORAL at 15:36

## 2017-05-04 RX ADMIN — Medication 10 ML: at 14:00

## 2017-05-04 RX ADMIN — ASPIRIN 325 MG: 325 TABLET ORAL at 09:58

## 2017-05-04 RX ADMIN — ASPIRIN 325 MG: 325 TABLET ORAL at 21:31

## 2017-05-04 RX ADMIN — HEPARIN SODIUM 5000 UNITS: 5000 INJECTION, SOLUTION INTRAVENOUS; SUBCUTANEOUS at 09:58

## 2017-05-04 RX ADMIN — CEFTRIAXONE 1 G: 1 INJECTION, POWDER, FOR SOLUTION INTRAMUSCULAR; INTRAVENOUS at 21:31

## 2017-05-04 RX ADMIN — HEPARIN SODIUM 5000 UNITS: 5000 INJECTION, SOLUTION INTRAVENOUS; SUBCUTANEOUS at 21:31

## 2017-05-04 RX ADMIN — PANTOPRAZOLE SODIUM 40 MG: 40 TABLET, DELAYED RELEASE ORAL at 09:58

## 2017-05-04 RX ADMIN — SODIUM CHLORIDE 75 ML/HR: 900 INJECTION, SOLUTION INTRAVENOUS at 18:32

## 2017-05-04 RX ADMIN — Medication 10 ML: at 22:00

## 2017-05-04 RX ADMIN — LORAZEPAM 0.5 MG: 2 INJECTION INTRAMUSCULAR; INTRAVENOUS at 16:07

## 2017-05-04 RX ADMIN — ZOLPIDEM TARTRATE 5 MG: 5 TABLET ORAL at 21:31

## 2017-05-04 RX ADMIN — LEVOTHYROXINE SODIUM 100 MCG: 150 TABLET ORAL at 06:46

## 2017-05-04 RX ADMIN — CLOPIDOGREL BISULFATE 75 MG: 75 TABLET ORAL at 21:31

## 2017-05-04 RX ADMIN — SODIUM CHLORIDE 75 ML/HR: 900 INJECTION, SOLUTION INTRAVENOUS at 03:54

## 2017-05-04 NOTE — PROGRESS NOTES
Problem: Falls - Risk of  Goal: *Absence of falls  Outcome: Progressing Towards Goal  Bed in lowest position with wheels locked. Call bell and frequently used items are within reach.

## 2017-05-04 NOTE — PROGRESS NOTES
Bedside shift change report given to Christiano RN (oncoming nurse) by Fawad Qiu (offgoing nurse). Report included the following information SBAR, Intake/Output, MAR, Recent Results and Cardiac Rhythm NSR.

## 2017-05-04 NOTE — PHYSICIAN ADVISORY
Justification for change of Observation status to Inpatient status    This patient was originally admitted as observation status. Upon review and discussion with Attending Physician this patient now meets for Inpatient Admission in accordance with CMS regulation Section 43 .3. Specifically, patient's stay is now over two midnights. This stay is medically necessary because leg weakness with falls. Evaluation indicates weakness and neuropathy in lower extremities which may relate to cervical spine disease. Also history of CAD with stent placement in past and on Plavix. Additionally UTI with positive cultures. Marceline Snellen, [address] [place] [address].  Care Management

## 2017-05-04 NOTE — PROGRESS NOTES
Neurosurgery Progress Note    Evi Montemayor, [facility]  [phone]    Admit Date: 2017   LOS: 0 days        Daily Progress Note: 2017      Subjective: The patient seems to be moving her legs a little bit better this morning. Her leg weakness seems to be multi-factorial. Her son states she is not very mobile and she shuffles her feet when she walks. She denies memory problems but does state she has had urinary incontinence for the past year. Denies bowel incontinence. She continues to complain a lot about knee pain, especially on the right side. Her left leg seems to move better than the right leg. Denies  chest pain,  nausea, vomiting, difficulty swallowing, headache, and dyspnea. Objective:     Vital signs  Temp (24hrs), Av.9 °F (36.6 °C), Min:97.6 °F (36.4 °C), Max:98.2 °F (36.8 °C)           Visit Vitals    /63 (BP 1 Location: Left arm, BP Patient Position: At rest)    Pulse 62    Temp 97.9 °F (36.6 °C)    Resp 20    Ht 5' 4\" (1.626 m)    Wt 112.2 kg (247 lb 5.7 oz)    SpO2 97%    Breastfeeding No    BMI 42.46 kg/m2      O2 Device: Room air     Pain control  Pain Assessment  Pain Scale 1: Numeric (0 - 10)  Pain Intensity 1: 0  Pain Onset 1: post fall  Pain Location 1: Leg  Pain Orientation 1: Left  Pain Description 1: Aching  Pain Intervention(s) 1: Medication (see MAR), Rest, Repositioned    PT/OT  Gait     Gait  Base of Support: Widened  Speed/Mere: Shuffled, Slow  Step Length: Right shortened, Left shortened  Gait Abnormalities: Decreased step clearance, Shuffling gait  Ambulation - Level of Assistance: Contact guard assistance  Distance (ft): 4 Feet (ft)  Assistive Device: Walker, rollator, Gait belt           Physical Exam:  Gen:NAD. Neuro: A&Ox3. Follows commands. Speech clear. Affect normal.  PERRL.   DIETRICH. Strength 5/5 in UE BL. Can lift heels off the bed this morning. Able to draw knees towards her chest, but does have some difficulty with this.  No pain in back with straight leg raise test. Upgoing toe on the right. Sensation intact bilaterally to light touch. Gait deferred. MRI L-spine without contrast on 05/03/17 shows L3-4 moderate-severe central spinal canal stenosis and moderate bilateral foraminal stenosis. L4-5 superimposed right central disc protrusion may affect the descending right L5 nerve as well as the right L4 nerve. Moderate central spinal canal stenosis at L4-5. Multilevel 1 mm grade 1 retrolisthesis and facet arthrosis. CT head without contrast on 05/02/17 shows ventricular enlargement out of proportion to sulcal prominence. Please evaluate for normal pressure hydrocephalus clinically. Left maxillary sinus disease. 24 hour results:    No results found for this or any previous visit (from the past 24 hour(s)). Assessment:     Principal Problem:    Weakness (5/2/2017)    Active Problems:    UTI (urinary tract infection) (5/4/2017)        Plan:   1. Lumbar stenosis   - No severe central stenosis. Main complaint seems to be knee pain. Is her knee arthritis playing into this? - Needs MRI of C-spine and T-spine to rule out cord impingement higher up as a possible cause for her lower extremity weakness. She seems weaker proximally  2. CAD   - On ASA and Plavix - these would need to be held if she were to need any sort of surgery   - Has 4 cardiac stents  3. Rhabdomyolysis   - Hospitalist following  4. UTI (POA)   - Hospitalist following    Plan d/w Dr. Ilene Maria. Pt needs further imaging of spinal axis. Normal pressure hydrocephalus could also be playing into this. A high volume LP could help differentiate this as well, though her blood thinning medications would need to be held.       Blanca Bojorquez, NP

## 2017-05-04 NOTE — PROGRESS NOTES
David met with pt and son, Roman Lipmal ([phone]) to discuss dispo. Pt is in Inpatient status as of today. Cm informed pt and son that therapy is recommending SNF rehab for pt and presented a list of facilities. This pt and her son would like this pt to go to [facility].David sent a referral to [facility] [facility]. David spoke with Caty Chavez in admissions at [facility] and he has accepted this pt for potentially Sunday. Cm informed pt and son that [facility] has accepted pt.  Oleg Bains

## 2017-05-04 NOTE — PROGRESS NOTES
Hospitalist Progress Note         Blas Ernandez MD  Call physician on-call through the  7pm-7am    Daily Progress Note: 2017    Assessment/Plan:   1. Multiple falls and lower extremity weakness: possibly mutlifactorial with lumbar stenosis, now UTI. MRI of the lumbar spine with 1. L3-4 moderate-severe central spinal canal stenosis and moderate bilateral foraminal stenosis. 2. L4-5 superimposed right central disc protrusion may affect the descending  right L5 nerve as well as the right L4 nerve. Moderate central spinal canal  stenosis at L4-5.  3. Multilevel 1 mm grade 1 retrolisthesis and facet arthrosis. .  NS consulted for further recs. Appreciate help  2. GNR UTI:  Now on IV rocephin. Final cx pending. 3.  Elevated troponin: trended up. On ASA and plavix. ECHO with EF of 55%. Cardiology consulted. On ASA and plavix. 4. Mild rhabdomyolysis:  On IVF   5. Recent fall with left knee pain: x-ray  left knee neg for fracture, shows OA. 6. DVT prophylaxis: on sq heparin           Code status: FULL    Subjective:   . No c/o SOB/CP/N/V. Review of Systems:     As in subjective    Objective:   Physical Exam:     Visit Vitals    /63 (BP 1 Location: Left arm, BP Patient Position: At rest)    Pulse 62    Temp 97.9 °F (36.6 °C)    Resp 20    Ht 5' 4\" (1.626 m)    Wt 112.2 kg (247 lb 5.7 oz)    SpO2 97%    Breastfeeding No    BMI 42.46 kg/m2      O2 Device: Room air    Temp (24hrs), Av.9 °F (36.6 °C), Min:97.6 °F (36.4 °C), Max:98.2 °F (36.8 °C)             General:  Alert, cooperative, no distress, appears stated age. Lungs:   Clear to auscultation bilaterally. Chest wall:  No tenderness or deformity. Heart:  Regular rate and rhythm, S1, S2 normal, no murmur, click, rub or gallop. Abdomen:   Soft, non-tender. Bowel sounds normal. No masses,  No organomegaly. Extremities: Extremities normal, atraumatic, no cyanosis or edema.    Pulses: 2+ and symmetric all extremities. Skin: Skin color, texture, turgor normal. No rashes or lesions   Neurologic: CNII-XII intact. Data Review:       Recent Days:  Recent Labs      05/02/17   1521   WBC  8.1   HGB  12.9   HCT  39.9   PLT  278     Recent Labs      05/02/17   1521   NA  141   K  3.4*   CL  107   CO2  25   GLU  102*   BUN  14   CREA  0.89   CA  8.3*   ALB  3.4*   SGOT  28   ALT  22     No results for input(s): PH, PCO2, PO2, HCO3, FIO2 in the last 72 hours. 24 Hour Results:  No results found for this or any previous visit (from the past 24 hour(s)).     Problem List:  Problem List as of 5/4/2017  Date Reviewed: 5/2/2017          Codes Class Noted - Resolved    UTI (urinary tract infection) ICD-10-CM: N39.0  ICD-9-CM: 599.0  5/4/2017 - Present        * (Principal)Weakness ICD-10-CM: R53.1  ICD-9-CM: 780.79  5/2/2017 - Present        Chronic insomnia ICD-10-CM: F51.04  ICD-9-CM: 780.52  10/20/2015 - Present        Chronic leg pain ICD-10-CM: M79.606, G89.29  ICD-9-CM: 729.5, 338.29  1/22/2015 - Present        AR (allergic rhinitis) ICD-10-CM: J30.9  ICD-9-CM: 477.9  7/10/2014 - Present        Obesity ICD-10-CM: E66.9  ICD-9-CM: 278.00  5/15/2014 - Present        Constipation ICD-10-CM: K59.00  ICD-9-CM: 564.00  3/15/2012 - Present        S/P angioplasty with stent ICD-10-CM: Z95.9  ICD-9-CM: V45.89  6/30/2011 - Present        Obesity, unspecified ICD-10-CM: E66.9  ICD-9-CM: 278.00  2/4/2010 - Present        Hypothyroidism ICD-10-CM: E03.9  ICD-9-CM: 244.9  2/1/2010 - Present        Pure hypercholesterolemia ICD-10-CM: E78.00  ICD-9-CM: 272.0  2/1/2010 - Present        Reflux esophagitis ICD-10-CM: K21.0  ICD-9-CM: 530.11  2/1/2010 - Present        CAD (coronary artery disease) ICD-10-CM: I25.10  ICD-9-CM: 414.00  2/1/2010 - Present        Insomnia ICD-10-CM: G47.00  ICD-9-CM: 780.52  2/1/2010 - Present        Depressive disorder, not elsewhere classified ICD-10-CM: F32.9  ICD-9-CM: 975  2/1/2010 - Present Venous insufficiency ICD-10-CM: I87.2  ICD-9-CM: 459.81  2/1/2010 - Present              Medications reviewed  Current Facility-Administered Medications   Medication Dose Route Frequency    aspirin (ASPIRIN) tablet 325 mg  325 mg Oral TID    levothyroxine (SYNTHROID) tablet 100 mcg  100 mcg Oral ACB    pantoprazole (PROTONIX) tablet 40 mg  40 mg Oral DAILY    zolpidem (AMBIEN) tablet 5 mg  5 mg Oral QHS PRN    sodium chloride (NS) flush 5-10 mL  5-10 mL IntraVENous Q8H    sodium chloride (NS) flush 5-10 mL  5-10 mL IntraVENous PRN    0.9% sodium chloride infusion  75 mL/hr IntraVENous CONTINUOUS    cefTRIAXone (ROCEPHIN) 1 g in 0.9% sodium chloride (MBP/ADV) 50 mL  1 g IntraVENous Q24H    acetaminophen (TYLENOL) tablet 650 mg  650 mg Oral Q4H PRN    heparin (porcine) injection 5,000 Units  5,000 Units SubCUTAneous Q12H    clopidogrel (PLAVIX) tablet 75 mg  75 mg Oral DAILY       Care Plan discussed with: Patient/Family, Nurse and     Total time spent with patient: 30 minutes.     Ata Gant MD

## 2017-05-04 NOTE — PROGRESS NOTES
PT Note:    Orders received and acknowledged. Chart reviewed. Noted neurosurgery, Dena Mattson MD, plans to order MRI of Cspine and Tspine to r/o major cord compression. Will await imaging and clearance by neurosurgery prior to completing evaluation. Will continue to follow.

## 2017-05-04 NOTE — PROGRESS NOTES
Pt seen and examined. Son counseled. She has some chronic degenerative lumbar stensosis which may be contributing to presentation, but is clearly not solely responsible. Will order mri c and t spine to r/o major cord compression. If she can come off blood thinners could try zeenat and/or high volume lp as outpt for further  Diagnosis.   Certainly knees, uti, and deconditioning are contributors

## 2017-05-04 NOTE — PROGRESS NOTES
[facility] [address] [place] [address] Progress Note    5/6/2017 8:30 AM  Admit Date: 5/2/2017  Admit Diagnosis: Falls;UTI (urinary tract infection)    Assessment/Plan     We will sign off at this point. Please feel free to contact us for any future problems. Thank you for allowing us to participate in the care of Maria Del Carmen Adair. 1. CAD- s/p Vision BMS (2.0x8) x2 in 11/05, mild ischemia on nuc stress in 2013 but does not want to proceed with a repeat cardiac cath or other testing and wishes to pursue medical management only, asymptomatic currently  -TTE showed normal LVEF and no WMA, no further cardiac testing warranted at this time  -will continue aspirin and plavix, only needs 81mg of ASA daily for CAD but takes 325mg TID at home for pain, refuses to take beta blocker or statin   -ok for discharge from cardiology standpoint with follow up with PCP, can follow up with cardiology PRN   2. Abnormal EKG- inferior infarct, anterior infarct, mildly abnormal nuc 8/13  3. Dyslipidemia- refuses to take statin,  in 12/16   4. Body mass index is 60.55 kg/(m^2). needs aggressive diet, weight loss and exercise, exercise limited by mobility   5. PAD- poor pulses, NATALIE normal in 2013, on ASA, refuses to take statin  6. Venous insufficiency- stable   7. Troponin elevation - peaked at 0.19 and trended down, no ischemic changes noted on initial ECG, will repeat ECG now, TTE with normal LVEF and no WMA, asymptomatic  -troponin elevation non specific in the setting of mild dehydration and rhabdomyolysis  8. LE weakness - MRI with spinal stenosis, B/L foraminal stenosis, disc protrusion and retrolisthesis, awaiting NS consult  9. UTI - on rocephin per IM  10. Hypokalemia - K 3.4 on 5/2/17 and repleted, will recheck in AM  11.   Hypothyroidism - on levothyroxine, TSH 1.8 in 8/16    Echo 5/17 - LVEF 55 %, no WMA, mildly dilated LA  Nuc Stress 9/2013 - LVEF 50%, lateral wall ischemia, STA in anterior wall  NATALIE 9/2013 - normal     Soc Hx: no tob occ wine, 2 glasses a day  Fam Hx: dad with MI, heart trouble. GF with MI at 48    Subjective:     Alirio Thomas denies chest pain, dyspnea, palpitations. Reports feeling a generalized achy feeling but denies fevers/chills. Discussed TTE results with her and her son. Reviewed troponin trend and advised her that initial ECG looked ok and would repeat ECG today. Objective:      Physical Exam:  Visit Vitals    /54 (BP 1 Location: Left arm, BP Patient Position: At rest)    Pulse (!) 56    Temp 98.2 °F (36.8 °C)    Resp 20    Ht 5' 4\" (1.626 m)    Wt (!) 352 lb 11.8 oz (160 kg)    SpO2 96%    Breastfeeding No    BMI 60.55 kg/m2     General Appearance:  Well developed, well nourished, alert and oriented x 3, in no acute distress. Ears/Nose/Mouth/Throat:   Hearing grossly normal.         Neck: Supple. Chest:   Diminished bases bilaterally. Cardiovascular:  Regular rate and rhythm, S1, S2 normal, no murmur. Abdomen:   Soft, non-tender, bowel sounds are active. Extremities: Trace LE edema bilaterally. Skin: Warm and dry. Telemetry: normal sinus rhythm    Data Review:   Labs:  No results found for this or any previous visit (from the past 24 hour(s)).         Current Facility-Administered Medications   Medication Dose Route Frequency    aspirin (ASPIRIN) tablet 325 mg  325 mg Oral TID    levothyroxine (SYNTHROID) tablet 100 mcg  100 mcg Oral ACB    pantoprazole (PROTONIX) tablet 40 mg  40 mg Oral DAILY    zolpidem (AMBIEN) tablet 5 mg  5 mg Oral QHS PRN    sodium chloride (NS) flush 5-10 mL  5-10 mL IntraVENous Q8H    sodium chloride (NS) flush 5-10 mL  5-10 mL IntraVENous PRN    0.9% sodium chloride infusion  75 mL/hr IntraVENous CONTINUOUS    cefTRIAXone (ROCEPHIN) 1 g in 0.9% sodium chloride (MBP/ADV) 50 mL  1 g IntraVENous Q24H    acetaminophen (TYLENOL) tablet 650 mg  650 mg Oral Q4H PRN    heparin (porcine) injection 5,000 Units  5,000 Units SubCUTAneous Q12H    clopidogrel (PLAVIX) tablet 75 mg  75 mg Oral DAILY       Negra Villanueva MD  [facility] [facility] [address]  [address]  [phone]

## 2017-05-05 LAB
ANION GAP BLD CALC-SCNC: 8 MMOL/L (ref 5–15)
BUN SERPL-MCNC: 13 MG/DL (ref 6–20)
BUN/CREAT SERPL: 14 (ref 12–20)
CALCIUM SERPL-MCNC: 8.3 MG/DL (ref 8.5–10.1)
CHLORIDE SERPL-SCNC: 109 MMOL/L (ref 97–108)
CK SERPL-CCNC: 127 U/L (ref 26–192)
CO2 SERPL-SCNC: 25 MMOL/L (ref 21–32)
CREAT SERPL-MCNC: 0.92 MG/DL (ref 0.55–1.02)
ERYTHROCYTE [DISTWIDTH] IN BLOOD BY AUTOMATED COUNT: 14 % (ref 11.5–14.5)
GLUCOSE SERPL-MCNC: 100 MG/DL (ref 65–100)
HCT VFR BLD AUTO: 36.2 % (ref 35–47)
HGB BLD-MCNC: 11.7 G/DL (ref 11.5–16)
MAGNESIUM SERPL-MCNC: 2.2 MG/DL (ref 1.6–2.4)
MCH RBC QN AUTO: 30.9 PG (ref 26–34)
MCHC RBC AUTO-ENTMCNC: 32.3 G/DL (ref 30–36.5)
MCV RBC AUTO: 95.5 FL (ref 80–99)
PLATELET # BLD AUTO: 263 K/UL (ref 150–400)
POTASSIUM SERPL-SCNC: 4.1 MMOL/L (ref 3.5–5.1)
RBC # BLD AUTO: 3.79 M/UL (ref 3.8–5.2)
SODIUM SERPL-SCNC: 142 MMOL/L (ref 136–145)
WBC # BLD AUTO: 6.4 K/UL (ref 3.6–11)

## 2017-05-05 PROCEDURE — 82550 ASSAY OF CK (CPK): CPT | Performed by: NURSE PRACTITIONER

## 2017-05-05 PROCEDURE — 97161 PT EVAL LOW COMPLEX 20 MIN: CPT

## 2017-05-05 PROCEDURE — 97530 THERAPEUTIC ACTIVITIES: CPT

## 2017-05-05 PROCEDURE — 74011250636 HC RX REV CODE- 250/636: Performed by: FAMILY MEDICINE

## 2017-05-05 PROCEDURE — 80048 BASIC METABOLIC PNL TOTAL CA: CPT | Performed by: NURSE PRACTITIONER

## 2017-05-05 PROCEDURE — 83735 ASSAY OF MAGNESIUM: CPT | Performed by: NURSE PRACTITIONER

## 2017-05-05 PROCEDURE — 97110 THERAPEUTIC EXERCISES: CPT

## 2017-05-05 PROCEDURE — 74011250637 HC RX REV CODE- 250/637: Performed by: FAMILY MEDICINE

## 2017-05-05 PROCEDURE — 74011000258 HC RX REV CODE- 258: Performed by: FAMILY MEDICINE

## 2017-05-05 PROCEDURE — 85027 COMPLETE CBC AUTOMATED: CPT | Performed by: NURSE PRACTITIONER

## 2017-05-05 PROCEDURE — 65660000000 HC RM CCU STEPDOWN

## 2017-05-05 PROCEDURE — 36415 COLL VENOUS BLD VENIPUNCTURE: CPT | Performed by: NURSE PRACTITIONER

## 2017-05-05 RX ADMIN — HEPARIN SODIUM 5000 UNITS: 5000 INJECTION, SOLUTION INTRAVENOUS; SUBCUTANEOUS at 09:09

## 2017-05-05 RX ADMIN — CEFTRIAXONE 1 G: 1 INJECTION, POWDER, FOR SOLUTION INTRAMUSCULAR; INTRAVENOUS at 22:30

## 2017-05-05 RX ADMIN — Medication 10 ML: at 14:00

## 2017-05-05 RX ADMIN — Medication 10 ML: at 22:34

## 2017-05-05 RX ADMIN — ASPIRIN 325 MG: 325 TABLET ORAL at 22:32

## 2017-05-05 RX ADMIN — ZOLPIDEM TARTRATE 5 MG: 5 TABLET ORAL at 22:32

## 2017-05-05 RX ADMIN — ASPIRIN 325 MG: 325 TABLET ORAL at 09:09

## 2017-05-05 RX ADMIN — HEPARIN SODIUM 5000 UNITS: 5000 INJECTION, SOLUTION INTRAVENOUS; SUBCUTANEOUS at 22:32

## 2017-05-05 RX ADMIN — LEVOTHYROXINE SODIUM 100 MCG: 150 TABLET ORAL at 07:29

## 2017-05-05 RX ADMIN — ASPIRIN 325 MG: 325 TABLET ORAL at 15:59

## 2017-05-05 RX ADMIN — CLOPIDOGREL BISULFATE 75 MG: 75 TABLET ORAL at 22:32

## 2017-05-05 RX ADMIN — Medication 10 ML: at 07:29

## 2017-05-05 RX ADMIN — ACETAMINOPHEN 650 MG: 325 TABLET, FILM COATED ORAL at 16:07

## 2017-05-05 RX ADMIN — PANTOPRAZOLE SODIUM 40 MG: 40 TABLET, DELAYED RELEASE ORAL at 09:09

## 2017-05-05 RX ADMIN — SODIUM CHLORIDE 75 ML/HR: 900 INJECTION, SOLUTION INTRAVENOUS at 10:59

## 2017-05-05 NOTE — PROGRESS NOTES
Neurosurgery Progress Note    Abelardo Castro [place]  [phone]    Admit Date: 2017   LOS: 1 day        Daily Progress Note: 2017      Subjective:   MRIs were completed of the patient's cervical and thoracic spine. There were no significant findings that require surgical intervention or that would explain lower extremity weakness. Pt worked with physical therapy and is extremely weak. She has difficulty clearing her bottom to scoot her hips in bed. She also is unable to stand at this point. Denies  chest pain,  nausea, vomiting, difficulty swallowing, headache, and dyspnea. Objective:     Vital signs  Temp (24hrs), Av.3 °F (36.8 °C), Min:98.1 °F (36.7 °C), Max:98.5 °F (36.9 °C)      1901 -  0700  In: -   Out: 300 [Urine:300]    Visit Vitals    /52 (BP 1 Location: Left arm, BP Patient Position: At rest)    Pulse (!) 58    Temp 98.3 °F (36.8 °C)    Resp 22    Ht 5' 4\" (1.626 m)    Wt 113 kg (249 lb 1.9 oz)    SpO2 96%    Breastfeeding No    BMI 42.76 kg/m2      O2 Device: Room air     Pain control  Pain Assessment  Pain Scale 1: Numeric (0 - 10)  Pain Intensity 1: 0  Pain Onset 1: post fall  Pain Location 1: Leg  Pain Orientation 1: Left  Pain Description 1: Aching  Pain Intervention(s) 1: Medication (see MAR), Rest, Repositioned    PT/OT  Gait     Gait  Base of Support: Widened  Speed/Mere: Shuffled, Slow  Step Length: Right shortened, Left shortened  Gait Abnormalities: Decreased step clearance, Shuffling gait  Ambulation - Level of Assistance: Contact guard assistance  Distance (ft): 4 Feet (ft)  Assistive Device: Walker, rollator, Gait belt           Physical Exam:  Gen:NAD. Neuro: A&Ox3. Follows commands. Speech clear. Affect normal.  PERRL.   DIETRICH. Strength 5/5 in UE BL. Able to lift legs off bed. Unable to stand on legs. No pain in back with straight leg raise test. Upgoing toe on the right. Sensation intact bilaterally to light touch.   Gait deferred. MRI T-spine without contrast and C-spine without contrast on 05/04/17 shows C5-6 and C6-7 moderate central spinal canal stenosis. Subtle ventral indentation of the spinal cord at C5-6. No cord compression or cord signal abnormality. Multilevel subluxation C4-T1. Subtle cervical kyphosis at C5-C6. Multilevel mild cervical spine stenoses are detailed above. No stenosis or cord compression in the thoracic spine. 24 hour results:    Recent Results (from the past 24 hour(s))   METABOLIC PANEL, BASIC    Collection Time: 05/05/17  3:22 AM   Result Value Ref Range    Sodium 142 136 - 145 mmol/L    Potassium 4.1 3.5 - 5.1 mmol/L    Chloride 109 (H) 97 - 108 mmol/L    CO2 25 21 - 32 mmol/L    Anion gap 8 5 - 15 mmol/L    Glucose 100 65 - 100 mg/dL    BUN 13 6 - 20 MG/DL    Creatinine 0.92 0.55 - 1.02 MG/DL    BUN/Creatinine ratio 14 12 - 20      GFR est AA >60 >60 ml/min/1.73m2    GFR est non-AA 59 (L) >60 ml/min/1.73m2    Calcium 8.3 (L) 8.5 - 10.1 MG/DL   MAGNESIUM    Collection Time: 05/05/17  3:22 AM   Result Value Ref Range    Magnesium 2.2 1.6 - 2.4 mg/dL   CBC W/O DIFF    Collection Time: 05/05/17  3:22 AM   Result Value Ref Range    WBC 6.4 3.6 - 11.0 K/uL    RBC 3.79 (L) 3.80 - 5.20 M/uL    HGB 11.7 11.5 - 16.0 g/dL    HCT 36.2 35.0 - 47.0 %    MCV 95.5 80.0 - 99.0 FL    MCH 30.9 26.0 - 34.0 PG    MCHC 32.3 30.0 - 36.5 g/dL    RDW 14.0 11.5 - 14.5 %    PLATELET 702 155 - 640 K/uL   CK    Collection Time: 05/05/17  3:22 AM   Result Value Ref Range     26 - 192 U/L          Assessment:     Principal Problem:    Weakness (5/2/2017)    Active Problems:    UTI (urinary tract infection) (5/4/2017)        Plan:   1. Lumbar stenosis   - No severe central stenosis. Main complaint seems to be knee pain. Is her knee arthritis playing into this?   - No cord compression in cervical or thoracic spine.   - PT/OT  2.  CAD   - On ASA and Plavix - these would need to be held if she were to need any sort of surgery   - Has 4 cardiac stents  3. Rhabdomyolysis   - Hospitalist following  4. UTI (POA)   - Hospitalist following    Plan d/w Dr. Tiffany Krishna. Imaging did not reveal a specific cause of her lower extremity weakness. Recommend f/u in 1 month. If having hip pain, an JOSE R may benefit her, but would need to come off of ASA/Plavix. She continues to report her knee pain is the main issue, which is completely unrelated to her back. She reports that she may be in [place] in a month with her son. Therefore, she can follow-up with orthopedics/neurosurgery in [place] as needed.       Abigail Fernandez NP

## 2017-05-05 NOTE — PROGRESS NOTES
Problem: Mobility Impaired (Adult and Pediatric)  Goal: *Acute Goals and Plan of Care (Insert Text)  Physical Therapy Goals  Initiated 5/5/2017  1. Patient will move from supine to sit and sit to supine , scoot up and down and roll side to side in bed with supervision/set-up within 7 day(s). 2. Patient will transfer from bed to chair and chair to bed with minimal assistance/contact guard assist using the least restrictive device within 7 day(s). 3. Patient will perform sit to stand with minimal assistance/contact guard assist within 7 day(s). 4. Patient will ambulate with minimal assistance/contact guard assist for 50 feet with the least restrictive device within 7 day(s). 5. Patient will demonstrated proper technique for supine and sitting exercises and perform all as prescribed within 7 days. PHYSICAL THERAPY EVALUATION  Patient: Les Olszewski (62 y.o. female)  Date: 5/5/2017  Primary Diagnosis: Falls  UTI (urinary tract infection)        Precautions: Fall      ASSESSMENT :  Based on the objective data described below, the patient presents with generalized deconditioning, self limiting mindset, and severely decreased functional mobility. Patient cleared by NP and RN for mobility and was received in bed with family member present. Patient was hesitant for goals of the session, expressed feelings of anxiety/fear of falling, but agreed to participate. Patient required Min A through BLE for bed mobility and to transfer to EOB. Once at EOB, patient reported mild dizziness but this was resolved in ~ 30 seconds when patient was instructed on pursed lip breathing. Patient attempted sit to stand to RW with walking with Max A x 2, additional time, verbal cueing for hand positioning, and encouragement but was unsuccessful. Patient is extremely anxious and self limiting due to thoughts of falling.  Patient was instructed on seated and supine exercises in order to be able to feel more stable and stronger for transfers and OOB activity. Patient had difficulty scooting laterally to [facility] and required Mod A, additional time, and constant verbal cueing for BLE positioning and head-hip ratio while scooting. Once back in bed, patient required Max A x 2 to scoot but she was able to minimally assist with pushing BLE in hooklying. NP was present at end of session. All needs were placed within reach. PT was unable to assess functional mobility OOB and will need to reassess when able. Next session: Evaluate exercises, sit to stand, and weight shifting/marches if appropriate/able     Patient will benefit from skilled intervention to address the above impairments. Patients rehabilitation potential is considered to be Fair  Factors which may influence rehabilitation potential include:   [ ]         None noted  [ ]         Mental ability/status   [ ]         Medical condition  [ ]         Home/family situation and support systems  [ ]         Safety awareness  [ ]         Pain tolerance/management  [X]         Other: SELF LIMITNG        PLAN :  Recommendations and Planned Interventions:  [X]           Bed Mobility Training             [X]    Neuromuscular Re-Education  [X]           Transfer Training                   [ ]    Orthotic/Prosthetic Training  [X]           Gait Training                         [ ]    Modalities  [X]           Therapeutic Exercises           [ ]    Edema Management/Control  [X]           Therapeutic Activities            [X]    Patient and Family Training/Education  [ ]           Other (comment):     Frequency/Duration: Patient will be followed by physical therapy  5 times a week to address goals. Discharge Recommendations: Rehab  Further Equipment Recommendations for Discharge: Owns rollator       SUBJECTIVE:   Patient stated The falls were so traumatic and I dont want to fall again.       OBJECTIVE DATA SUMMARY:   HISTORY:    Past Medical History:   Diagnosis Date    Depression      GERD (gastroesophageal reflux disease)      Hypercholesterolemia      Thyroid disease       Past Surgical History:   Procedure Laterality Date    HX CORONARY STENT PLACEMENT   2005     x4    TOTAL HIP ARTHROPLASTY         bilateral     Prior Level of Function/Home Situation: Mod I  Personal factors and/or comorbidities impacting plan of care:      Home Situation  Home Environment: Independent living (ind living facility)  Care Facility Name: [facility]  # Steps to Enter: 5  One/Two Story Residence: One story  Living Alone: Yes  Support Systems: Family member(s)  Patient Expects to be Discharged to[de-identified] Skilled nursing facility  Current DME Used/Available at Home: Cale Altes, rollator (reacher)  Tub or Shower Type: Shower     EXAMINATION/PRESENTATION/DECISION MAKING:   Critical Behavior:  Neurologic State: Alert, Appropriate for age, Eyes open spontaneously  Orientation Level: Oriented to place, Oriented to person, Oriented to situation, Oriented to time  Cognition: Appropriate for age attention/concentration, Appropriate decision making, Appropriate safety awareness, Follows commands     Hearing: Auditory  Auditory Impairment: None  Skin:    Edema:   Range Of Motion:  AROM: Generally decreased, functional           PROM: Generally decreased, functional           Strength:    Strength: Generally decreased, functional                    Tone & Sensation:                                  Coordination:  Coordination: Generally decreased, functional  Vision:      Functional Mobility:  Bed Mobility:  Rolling: Minimum assistance; Additional time  Supine to Sit: Minimum assistance; Additional time (Min A for BLE)  Sit to Supine: Minimum assistance (Min A for BLE)  Scooting: Maximum assistance;Assist x2; Additional time               Lateral scooting at EOB - Max A, Additional time - verbal cueing for head hip ratio was most successful cue  Transfers:  Sit to Stand:  (Attempted x 3 with verbal cueing, unsuccsessful) Balance:   Sitting: Intact  Ambulation/Gait Training:                                                                               Stairs: Therapeutic Exercises:   LAQ x 15, each  Quad Sets x 15, ech     Functional Measure:  Barthel Index:      Bathin  Bladder: 0  Bowels: 10  Groomin  Dressin  Feeding: 10  Mobility: 0  Stairs: 0  Toilet Use: 10  Transfer (Bed to Chair and Back): 5  Total: 45         Barthel and G-code impairment scale:  Percentage of impairment CH  0% CI  1-19% CJ  20-39% CK  40-59% CL  60-79% CM  80-99% CN  100%   Barthel Score 0-100 100 99-80 79-60 59-40 20-39 1-19    0   Barthel Score 0-20 20 17-19 13-16 9-12 5-8 1-4 0      The Barthel ADL Index: Guidelines  1. The index should be used as a record of what a patient does, not as a record of what a patient could do. 2. The main aim is to establish degree of independence from any help, physical or verbal, however minor and for whatever reason. 3. The need for supervision renders the patient not independent. 4. A patient's performance should be established using the best available evidence. Asking the patient, friends/relatives and nurses are the usual sources, but direct observation and common sense are also important. However direct testing is not needed. 5. Usually the patient's performance over the preceding 24-48 hours is important, but occasionally longer periods will be relevant. 6. Middle categories imply that the patient supplies over 50 per cent of the effort. 7. Use of aids to be independent is allowed. Natali Baron., Barthel, D.W. (7882). Functional evaluation: the Barthel Index. [facility] (14)2. HATTIE Cuevas, Edith Dalal., Marianela Smith., Lizett Anderson, [address] (). Measuring the change indisability after inpatient rehabilitation; comparison of the responsiveness of the Barthel Index and Functional [place] Measure.  Journal of Neurology, Neurosurgery, and Psychiatry, 66(4), 071-705. CATRINA Vivas, ELIDA Smith, & Cruzito Velez M.A. (2004.) Assessment of post-stroke quality of life in cost-effectiveness studies: The usefulness of the Barthel Index and the EuroQoL-5D. Quality of Life Research, 13, 575-48            G codes: In compliance with CMSs Claims Based Outcome Reporting, the following G-code set was chosen for this patient based on their primary functional limitation being treated: The outcome measure chosen to determine the severity of the functional limitation was the Barthel with a score of 45/100 which was correlated with the impairment scale. · Mobility - Walking and Moving Around:               - CURRENT STATUS:    CK - 40%-59% impaired, limited or restricted               - GOAL STATUS:           CJ - 20%-39% impaired, limited or restricted               - D/C STATUS:                       ---------------To be determined---------------      Physical Therapy Evaluation Charge Determination   History Examination Presentation Decision-Making   MEDIUM  Complexity : 1-2 comorbidities / personal factors will impact the outcome/ POC  LOW Complexity : 1-2 Standardized tests and measures addressing body structure, function, activity limitation and / or participation in recreation  LOW Complexity : Stable, uncomplicated  Other outcome measures Barthel  LOW       Based on the above components, the patient evaluation is determined to be of the following complexity level: LOW      Pain:  Pain Scale 1: Numeric (0 - 10)  Pain Intensity 1: 0              Activity Tolerance:   Poor, self limiting and unable to complete any OOB transfers     Please refer to the flowsheet for vital signs taken during this treatment.   After treatment:   [ ]         Patient left in no apparent distress sitting up in chair  [X]         Patient left in no apparent distress in bed  [X]         Call bell left within reach  [X]         Nursing notified  [X]         Caregiver present  [ ]         Bed alarm activated      COMMUNICATION/EDUCATION:   The patients plan of care was discussed with: Registered Nurse.  [X]         Fall prevention education was provided and the patient/caregiver indicated understanding. [X]         Patient/family have participated as able in goal setting and plan of care. [X]         Patient/family agree to work toward stated goals and plan of care. [ ]         Patient understands intent and goals of therapy, but is neutral about his/her participation. [ ]         Patient is unable to participate in goal setting and plan of care.      Thank you for this referral.  Nael Cluster   Time Calculation: 35 mins

## 2017-05-05 NOTE — PROGRESS NOTES
Hospitalist Progress Note             Daily Progress Note: 2017    Assessment/Plan:   1. Multiple falls and lower extremity weakness: possibly mutlifactorial with lumbar stenosis. MRI of the lumbar spine with:  L3-4 moderate-severe central spinal canal stenosis and moderate bilateral foraminal stenosis. L4-5 superimposed right central disc protrusion may affect the descending right L5 nerve as well as the right L4 nerve. Moderate central spinal canal stenosis at L4-5.; Multilevel 1 mm grade 1 retrolisthesis and facet arthrosis. .  NS consulted for further recs. 2.GNR UTI:  Now on IV rocephin. Final cx reviewed. 3.  Elevated troponin: trended up. On ASA and plavix. ECHO with EF of 55%. Cardiology consulted. On ASA and plavix. 4. Mild rhabdomyolysis:  On IVF resolved  5. Recent fall with left knee pain: x-ray  left knee neg for fracture, shows OA. 6. DVT prophylaxis: on sq heparin           Code status: FULL    Subjective:   . No c/o SOB/CP/N/V. Review of Systems:     As in subjective    Objective:   Physical Exam:     Visit Vitals    /61 (BP 1 Location: Left arm, BP Patient Position: At rest)    Pulse (!) 55    Temp 98.5 °F (36.9 °C)    Resp 16    Ht 5' 4\" (1.626 m)    Wt 113 kg (249 lb 1.9 oz)    SpO2 97%    Breastfeeding No    BMI 42.76 kg/m2      O2 Device: Room air    Temp (24hrs), Av.2 °F (36.8 °C), Min:97.9 °F (36.6 °C), Max:98.5 °F (36.9 °C)         1901 -  0700  In: -   Out: 300 [Urine:300]    General:  Alert, cooperative, no distress, appears stated age. Lungs:   Clear to auscultation bilaterally. Chest wall:  No tenderness or deformity. Heart:  Regular rate and rhythm, S1, S2 normal, no murmur, click, rub or gallop. Abdomen:   Soft, non-tender. Bowel sounds normal. No masses,  No organomegaly. Extremities: Extremities normal, atraumatic, no cyanosis or edema. Pulses: 2+ and symmetric all extremities.    Skin: Skin color, texture, turgor normal. No rashes or lesions   Neurologic: CNII-XII intact. Data Review:       Recent Days:  Recent Labs      05/05/17   0322  05/02/17   1521   WBC  6.4  8.1   HGB  11.7  12.9   HCT  36.2  39.9   PLT  263  278     Recent Labs      05/05/17   0322  05/02/17   1521   NA  142  141   K  4.1  3.4*   CL  109*  107   CO2  25  25   GLU  100  102*   BUN  13  14   CREA  0.92  0.89   CA  8.3*  8.3*   MG  2.2   --    ALB   --   3.4*   SGOT   --   28   ALT   --   22     No results for input(s): PH, PCO2, PO2, HCO3, FIO2 in the last 72 hours.     24 Hour Results:  Recent Results (from the past 24 hour(s))   METABOLIC PANEL, BASIC    Collection Time: 05/05/17  3:22 AM   Result Value Ref Range    Sodium 142 136 - 145 mmol/L    Potassium 4.1 3.5 - 5.1 mmol/L    Chloride 109 (H) 97 - 108 mmol/L    CO2 25 21 - 32 mmol/L    Anion gap 8 5 - 15 mmol/L    Glucose 100 65 - 100 mg/dL    BUN 13 6 - 20 MG/DL    Creatinine 0.92 0.55 - 1.02 MG/DL    BUN/Creatinine ratio 14 12 - 20      GFR est AA >60 >60 ml/min/1.73m2    GFR est non-AA 59 (L) >60 ml/min/1.73m2    Calcium 8.3 (L) 8.5 - 10.1 MG/DL   MAGNESIUM    Collection Time: 05/05/17  3:22 AM   Result Value Ref Range    Magnesium 2.2 1.6 - 2.4 mg/dL   CBC W/O DIFF    Collection Time: 05/05/17  3:22 AM   Result Value Ref Range    WBC 6.4 3.6 - 11.0 K/uL    RBC 3.79 (L) 3.80 - 5.20 M/uL    HGB 11.7 11.5 - 16.0 g/dL    HCT 36.2 35.0 - 47.0 %    MCV 95.5 80.0 - 99.0 FL    MCH 30.9 26.0 - 34.0 PG    MCHC 32.3 30.0 - 36.5 g/dL    RDW 14.0 11.5 - 14.5 %    PLATELET 430 437 - 959 K/uL   CK    Collection Time: 05/05/17  3:22 AM   Result Value Ref Range     26 - 192 U/L       Problem List:  Problem List as of 5/5/2017  Date Reviewed: 5/2/2017          Codes Class Noted - Resolved    UTI (urinary tract infection) ICD-10-CM: N39.0  ICD-9-CM: 599.0  5/4/2017 - Present        * (Principal)Weakness ICD-10-CM: R53.1  ICD-9-CM: 780.79  5/2/2017 - Present        Chronic insomnia ICD-10-CM: F51.04  ICD-9-CM: 780.52  10/20/2015 - Present        Chronic leg pain ICD-10-CM: M79.606, G89.29  ICD-9-CM: 729.5, 338.29  1/22/2015 - Present        AR (allergic rhinitis) ICD-10-CM: J30.9  ICD-9-CM: 477.9  7/10/2014 - Present        Obesity ICD-10-CM: E66.9  ICD-9-CM: 278.00  5/15/2014 - Present        Constipation ICD-10-CM: K59.00  ICD-9-CM: 564.00  3/15/2012 - Present        S/P angioplasty with stent ICD-10-CM: Z95.9  ICD-9-CM: V45.89  6/30/2011 - Present        Obesity, unspecified ICD-10-CM: E66.9  ICD-9-CM: 278.00  2/4/2010 - Present        Hypothyroidism ICD-10-CM: E03.9  ICD-9-CM: 244.9  2/1/2010 - Present        Pure hypercholesterolemia ICD-10-CM: E78.00  ICD-9-CM: 272.0  2/1/2010 - Present        Reflux esophagitis ICD-10-CM: K21.0  ICD-9-CM: 530.11  2/1/2010 - Present        CAD (coronary artery disease) ICD-10-CM: I25.10  ICD-9-CM: 414.00  2/1/2010 - Present        Insomnia ICD-10-CM: G47.00  ICD-9-CM: 780.52  2/1/2010 - Present        Depressive disorder, not elsewhere classified ICD-10-CM: F32.9  ICD-9-CM: 355  2/1/2010 - Present        Venous insufficiency ICD-10-CM: I87.2  ICD-9-CM: 459.81  2/1/2010 - Present              Medications reviewed  Current Facility-Administered Medications   Medication Dose Route Frequency    aspirin (ASPIRIN) tablet 325 mg  325 mg Oral TID    levothyroxine (SYNTHROID) tablet 100 mcg  100 mcg Oral ACB    pantoprazole (PROTONIX) tablet 40 mg  40 mg Oral DAILY    zolpidem (AMBIEN) tablet 5 mg  5 mg Oral QHS PRN    sodium chloride (NS) flush 5-10 mL  5-10 mL IntraVENous Q8H    sodium chloride (NS) flush 5-10 mL  5-10 mL IntraVENous PRN    0.9% sodium chloride infusion  75 mL/hr IntraVENous CONTINUOUS    cefTRIAXone (ROCEPHIN) 1 g in 0.9% sodium chloride (MBP/ADV) 50 mL  1 g IntraVENous Q24H    acetaminophen (TYLENOL) tablet 650 mg  650 mg Oral Q4H PRN    heparin (porcine) injection 5,000 Units  5,000 Units SubCUTAneous Q12H    clopidogrel (PLAVIX) tablet 75 mg  75 mg Oral DAILY       Care Plan discussed with: Patient/Family, Nurse and     Total time spent with patient: 30 minutes.     Jayda Major MD

## 2017-05-05 NOTE — PROGRESS NOTES
Bedside and Verbal shift change report given to EDGAR Irwin (oncoming nurse) by Marquita Pascal RN (offgoing nurse). Report included the following information SBAR, Kardex, Procedure Summary, Intake/Output, MAR, Accordion, Recent Results and Cardiac Rhythm NSR/SB.

## 2017-05-05 NOTE — INTERDISCIPLINARY ROUNDS
IDR/SLIDR Summary          Patient: Belia Christiansen MRN: [id number]    Age: 67 y.o. YOB: 1940 Room/Bed: [facility]   Admit Diagnosis: Falls  UTI (urinary tract infection)  Principal Diagnosis: Weakness   Goals: IV fluids, IV abx  Readmission: NO  Quality Measure: Not applicable  VTE Prophylaxis: Chemical  Influenza Vaccine screening completed? YES  Pneumococcal Vaccine screening completed? NO  Mobility needs: Yes   Nutrition plan:No  Consults: P. T and O.T. Financial concerns:No  Escalated to CM? NO  RRAT Score: 12   Interventions:  Testing due for pt today?  YES  LOS: 1 days Expected length of stay </= 2 days days  Discharge plan: TBD   PCP: Nirmala Resendez MD  Transportation needs: Yes    Days before discharge:two or more days before discharge   Discharge disposition: TBD    Signed:     Christiano Montague  5/5/2017  7:00 AM

## 2017-05-06 PROCEDURE — 97530 THERAPEUTIC ACTIVITIES: CPT

## 2017-05-06 PROCEDURE — 65660000000 HC RM CCU STEPDOWN

## 2017-05-06 PROCEDURE — 74011000258 HC RX REV CODE- 258: Performed by: FAMILY MEDICINE

## 2017-05-06 PROCEDURE — 74011250636 HC RX REV CODE- 250/636: Performed by: FAMILY MEDICINE

## 2017-05-06 PROCEDURE — 74011250637 HC RX REV CODE- 250/637: Performed by: FAMILY MEDICINE

## 2017-05-06 RX ADMIN — LEVOTHYROXINE SODIUM 100 MCG: 150 TABLET ORAL at 07:07

## 2017-05-06 RX ADMIN — SODIUM CHLORIDE 75 ML/HR: 900 INJECTION, SOLUTION INTRAVENOUS at 03:50

## 2017-05-06 RX ADMIN — Medication 10 ML: at 21:11

## 2017-05-06 RX ADMIN — ASPIRIN 325 MG: 325 TABLET ORAL at 21:10

## 2017-05-06 RX ADMIN — PANTOPRAZOLE SODIUM 40 MG: 40 TABLET, DELAYED RELEASE ORAL at 08:46

## 2017-05-06 RX ADMIN — SODIUM CHLORIDE 75 ML/HR: 900 INJECTION, SOLUTION INTRAVENOUS at 13:03

## 2017-05-06 RX ADMIN — CEFTRIAXONE 1 G: 1 INJECTION, POWDER, FOR SOLUTION INTRAMUSCULAR; INTRAVENOUS at 22:17

## 2017-05-06 RX ADMIN — Medication 10 ML: at 17:49

## 2017-05-06 RX ADMIN — ASPIRIN 325 MG: 325 TABLET ORAL at 17:49

## 2017-05-06 RX ADMIN — ZOLPIDEM TARTRATE 5 MG: 5 TABLET ORAL at 21:10

## 2017-05-06 RX ADMIN — ASPIRIN 325 MG: 325 TABLET ORAL at 08:46

## 2017-05-06 RX ADMIN — CLOPIDOGREL BISULFATE 75 MG: 75 TABLET ORAL at 21:10

## 2017-05-06 RX ADMIN — HEPARIN SODIUM 5000 UNITS: 5000 INJECTION, SOLUTION INTRAVENOUS; SUBCUTANEOUS at 21:10

## 2017-05-06 RX ADMIN — Medication 10 ML: at 07:08

## 2017-05-06 RX ADMIN — ACETAMINOPHEN 650 MG: 325 TABLET, FILM COATED ORAL at 22:20

## 2017-05-06 RX ADMIN — ACETAMINOPHEN 650 MG: 325 TABLET, FILM COATED ORAL at 07:10

## 2017-05-06 NOTE — PROGRESS NOTES
Hospitalist Progress Note             Daily Progress Note: 2017    Assessment/Plan:   1. Multiple falls and lower extremity weakness: possibly mutlifactorial with lumbar stenosis. MRI of the lumbar spine with:  L3-4 moderate-severe central spinal canal stenosis and moderate bilateral foraminal stenosis. L4-5 superimposed right central disc protrusion may affect the descending right L5 nerve as well as the right L4 nerve. Moderate central spinal canal stenosis at L4-5.; Multilevel 1 mm grade 1 retrolisthesis and facet arthrosis. .  NS consulted for further recs. 2.GNR UTI:  Now on IV rocephin. Final cx reviewed. 3.  Elevated troponin: trended up. On ASA and plavix. ECHO with EF of 55%. Cardiology consulted. On ASA and plavix. 4. Mild rhabdomyolysis:  On IVF resolved  5. Recent fall with left knee pain: x-ray  left knee neg for fracture, shows OA. 6. D/c to rehab in AM    6. DVT prophylaxis: on sq heparin           Code status: FULL    Subjective:   . No c/o SOB/CP/N/V. Review of Systems:     As in subjective    Objective:   Physical Exam:     Visit Vitals    /65 (BP 1 Location: Left arm, BP Patient Position: At rest)    Pulse 63    Temp 97.9 °F (36.6 °C)    Resp 18    Ht 5' 4\" (1.626 m)    Wt (!) 160 kg (352 lb 11.8 oz)    SpO2 95%    Breastfeeding No    BMI 60.55 kg/m2      O2 Device: Room air    Temp (24hrs), Av °F (36.7 °C), Min:97.8 °F (36.6 °C), Max:98.3 °F (36.8 °C)         190 -  0700  In: 5230 [P.O.:250; I.V.:5370]  Out: 1100 [Urine:1100]    General:  Alert, cooperative, no distress, appears stated age. Lungs:   Clear to auscultation bilaterally. Chest wall:  No tenderness or deformity. Heart:  Regular rate and rhythm, S1, S2 normal, no murmur, click, rub or gallop. Abdomen:   Soft, non-tender. Bowel sounds normal. No masses,  No organomegaly. Extremities: Extremities normal, atraumatic, no cyanosis or edema.    Pulses: 2+ and symmetric all extremities. Skin: Skin color, texture, turgor normal. No rashes or lesions   Neurologic: CNII-XII intact. Data Review:       Recent Days:  Recent Labs      05/05/17   0322   WBC  6.4   HGB  11.7   HCT  36.2   PLT  263     Recent Labs      05/05/17   0322   NA  142   K  4.1   CL  109*   CO2  25   GLU  100   BUN  13   CREA  0.92   CA  8.3*   MG  2.2     No results for input(s): PH, PCO2, PO2, HCO3, FIO2 in the last 72 hours. 24 Hour Results:  No results found for this or any previous visit (from the past 24 hour(s)).     Problem List:  Problem List as of 5/6/2017  Date Reviewed: 5/2/2017          Codes Class Noted - Resolved    UTI (urinary tract infection) ICD-10-CM: N39.0  ICD-9-CM: 599.0  5/4/2017 - Present        * (Principal)Weakness ICD-10-CM: R53.1  ICD-9-CM: 780.79  5/2/2017 - Present        Chronic insomnia ICD-10-CM: F51.04  ICD-9-CM: 780.52  10/20/2015 - Present        Chronic leg pain ICD-10-CM: M79.606, G89.29  ICD-9-CM: 729.5, 338.29  1/22/2015 - Present        AR (allergic rhinitis) ICD-10-CM: J30.9  ICD-9-CM: 477.9  7/10/2014 - Present        Obesity ICD-10-CM: E66.9  ICD-9-CM: 278.00  5/15/2014 - Present        Constipation ICD-10-CM: K59.00  ICD-9-CM: 564.00  3/15/2012 - Present        S/P angioplasty with stent ICD-10-CM: Z95.9  ICD-9-CM: V45.89  6/30/2011 - Present        Obesity, unspecified ICD-10-CM: E66.9  ICD-9-CM: 278.00  2/4/2010 - Present        Hypothyroidism ICD-10-CM: E03.9  ICD-9-CM: 244.9  2/1/2010 - Present        Pure hypercholesterolemia ICD-10-CM: E78.00  ICD-9-CM: 272.0  2/1/2010 - Present        Reflux esophagitis ICD-10-CM: K21.0  ICD-9-CM: 530.11  2/1/2010 - Present        CAD (coronary artery disease) ICD-10-CM: I25.10  ICD-9-CM: 414.00  2/1/2010 - Present        Insomnia ICD-10-CM: G47.00  ICD-9-CM: 780.52  2/1/2010 - Present        Depressive disorder, not elsewhere classified ICD-10-CM: F32.9  ICD-9-CM: 424  2/1/2010 - Present        Venous insufficiency ICD-10-CM: I87.2  ICD-9-CM: 459.81  2/1/2010 - Present              Medications reviewed  Current Facility-Administered Medications   Medication Dose Route Frequency    aspirin (ASPIRIN) tablet 325 mg  325 mg Oral TID    levothyroxine (SYNTHROID) tablet 100 mcg  100 mcg Oral ACB    pantoprazole (PROTONIX) tablet 40 mg  40 mg Oral DAILY    zolpidem (AMBIEN) tablet 5 mg  5 mg Oral QHS PRN    sodium chloride (NS) flush 5-10 mL  5-10 mL IntraVENous Q8H    sodium chloride (NS) flush 5-10 mL  5-10 mL IntraVENous PRN    0.9% sodium chloride infusion  75 mL/hr IntraVENous CONTINUOUS    cefTRIAXone (ROCEPHIN) 1 g in 0.9% sodium chloride (MBP/ADV) 50 mL  1 g IntraVENous Q24H    acetaminophen (TYLENOL) tablet 650 mg  650 mg Oral Q4H PRN    heparin (porcine) injection 5,000 Units  5,000 Units SubCUTAneous Q12H    clopidogrel (PLAVIX) tablet 75 mg  75 mg Oral DAILY       Care Plan discussed with: Patient/Family, Nurse and     Total time spent with patient: 30 minutes.     Qi Patricia MD

## 2017-05-06 NOTE — PROGRESS NOTES
Problem: Mobility Impaired (Adult and Pediatric)  Goal: *Acute Goals and Plan of Care (Insert Text)  Physical Therapy Goals  Initiated 5/5/2017  1. Patient will move from supine to sit and sit to supine , scoot up and down and roll side to side in bed with supervision/set-up within 7 day(s). 2. Patient will transfer from bed to chair and chair to bed with minimal assistance/contact guard assist using the least restrictive device within 7 day(s). 3. Patient will perform sit to stand with minimal assistance/contact guard assist within 7 day(s). 4. Patient will ambulate with minimal assistance/contact guard assist for 50 feet with the least restrictive device within 7 day(s). 5. Patient will demonstrated proper technique for supine and sitting exercises and perform all as prescribed within 7 days. PHYSICAL THERAPY TREATMENT  Patient: Belinda Kasper (1 y.o. female)  Date: 5/6/2017  Diagnosis: Falls  UTI (urinary tract infection) Weakness       Precautions: Fall      ASSESSMENT:  Pt with great improvement this session, in that she was able to transfer out of bed to chair and completed sit to stand transfers x3 reps. Pt reports continued fear of falling, but this fear is improving. Pt was able to complete all mobility with minimal assistance overall, increased time, and two people for both safety and pt's security. Pt excited to be out of bed to chair. Recommend inpatient rehab setting, as she has potential for marked improvement with intensive therapies. Pt can tolerate 3 hours therapy per day. Progression toward goals:  [X]    Improving appropriately and progressing toward goals  [ ]    Improving slowly and progressing toward goals  [ ]    Not making progress toward goals and plan of care will be adjusted       PLAN:  Patient continues to benefit from skilled intervention to address the above impairments. Continue treatment per established plan of care.   Discharge Recommendations:  Inpatient Rehab  Further Equipment Recommendations for Discharge:  TBD       SUBJECTIVE:   Patient stated I'm so excited to be in the chair!       OBJECTIVE DATA SUMMARY:   Critical Behavior:  Neurologic State: Alert  Orientation Level: Oriented X4  Cognition: Appropriate decision making, Appropriate for age attention/concentration, Appropriate safety awareness, Follows commands     Functional Mobility Training:  Bed Mobility:  Rolling: Minimum assistance; Additional time (verbal cues for technique)  Supine to Sit: Minimum assistance; Additional time     Scooting: Supervision; Additional time (scoot toward head of bed in sitting position)        Transfers:  Sit to Stand: Minimum assistance;Assist x2 (pt fearful of falling)  Stand to Sit: Minimum assistance;Assist x2        Bed to Chair: Minimum assistance;Assist x2         Balance:  Sitting: Intact  Standing: Impaired  Standing - Static: Fair  Standing - Dynamic : Fair  Therapeutic Exercises:   Pt educated in seated ankle pumps, long arc quads, seated marching, and shoulder flexion. Encouraged 10 reps, 2 sets when up in chair. Pain:  Pain Scale 1: Numeric (0 - 10)  Pain Intensity 1: 0  Pain Location 1: Leg  Pain Orientation 1: Left;Right  Pain Description 1: Aching  Pain Intervention(s) 1: Medication (see MAR); Rest;Repositioned  Activity Tolerance:   Please refer to the flowsheet for vital signs taken during this treatment.   After treatment:   [X]    Patient left in no apparent distress sitting up in chair  [ ]    Patient left in no apparent distress in bed  [X]    Call bell left within reach  [X]    Nursing notified  [X]    Caregiver present  [ ]    Bed alarm activated      COMMUNICATION/COLLABORATION:   The patients plan of care was discussed with: Registered Nurse     Iglesia Talavera PT, DPT   Time Calculation: 40 mins

## 2017-05-06 NOTE — PROGRESS NOTES
Bedside shift change report given to Lashonda Coronado RN (oncoming nurse) by Chepe Stanley RN (offgoing nurse). Report included the following information SBAR, Kardex, ED Summary, Procedure Summary, Intake/Output, MAR, Accordion, Recent Results, Med Rec Status and Cardiac Rhythm NSR/Sinus Brenda Bowen.

## 2017-05-06 NOTE — PROGRESS NOTES
Bedside and Verbal shift change report given to Naa Calderon (oncoming nurse) by Nawaf Knight RN (offgoing nurse). Report included the following information SBAR, Kardex, Intake/Output and MAR.

## 2017-05-07 VITALS
WEIGHT: 253.77 LBS | HEIGHT: 64 IN | DIASTOLIC BLOOD PRESSURE: 46 MMHG | SYSTOLIC BLOOD PRESSURE: 135 MMHG | RESPIRATION RATE: 20 BRPM | BODY MASS INDEX: 43.32 KG/M2 | TEMPERATURE: 97.9 F | OXYGEN SATURATION: 96 % | HEART RATE: 57 BPM

## 2017-05-07 PROCEDURE — 74011250637 HC RX REV CODE- 250/637: Performed by: FAMILY MEDICINE

## 2017-05-07 PROCEDURE — 74011250636 HC RX REV CODE- 250/636: Performed by: FAMILY MEDICINE

## 2017-05-07 RX ORDER — CEPHALEXIN 500 MG/1
500 CAPSULE ORAL 3 TIMES DAILY
Qty: 15 CAP | Refills: 0 | Status: SHIPPED | OUTPATIENT
Start: 2017-05-07 | End: 2017-05-12

## 2017-05-07 RX ADMIN — Medication 10 ML: at 07:31

## 2017-05-07 RX ADMIN — LEVOTHYROXINE SODIUM 100 MCG: 150 TABLET ORAL at 07:30

## 2017-05-07 RX ADMIN — SODIUM CHLORIDE 75 ML/HR: 900 INJECTION, SOLUTION INTRAVENOUS at 04:30

## 2017-05-07 RX ADMIN — ASPIRIN 325 MG: 325 TABLET ORAL at 08:21

## 2017-05-07 RX ADMIN — PANTOPRAZOLE SODIUM 40 MG: 40 TABLET, DELAYED RELEASE ORAL at 08:21

## 2017-05-07 NOTE — ROUTINE PROCESS
TRANSFER - OUT REPORT:    Verbal report given to Amanda(name) on Nelson Elaine  being transferred to [facility]) for routine progression of care       Report consisted of patients Situation, Background, Assessment and   Recommendations(SBAR). Information from the following report(s) SBAR, Kardex, Intake/Output, MAR, Recent Results, Med Rec Status and Cardiac Rhythm SB was reviewed with the receiving nurse. Lines:       Opportunity for questions and clarification was provided. Patient transported with:   Patients medications from home and all belongings.

## 2017-05-07 NOTE — PROGRESS NOTES
CM follow up. CM spoke with  at [facility], who confirmed that patient is accepted and that bed is available today . Nurse Report to be called to [phone]. CM faxed  AVS to the facility. CM sent referral via [employer] to  [address] [facility]) for BLS transport, and referral was accepted  for a  1:00PM . CM completed PCS and placed it on chart. CM gave verbal update to staff nurse.

## 2017-05-07 NOTE — INTERDISCIPLINARY ROUNDS
IDR/SLIDR Summary          Patient: Kalin Meza MRN: [id number]    Age: 67 y.o. YOB: 1940 Room/Bed: [facility]   Admit Diagnosis: Falls  UTI (urinary tract infection)  Principal Diagnosis: Weakness   Goals Increase Mobility  Readmission: NO  Quality Measure: Not applicable  VTE Prophylaxis: Chemical  Influenza Vaccine screening completed? YES  Pneumococcal Vaccine screening completed? YES  Mobility needs: Yes   Nutrition plan NO  Consults: P. T    Financial concerns:No  Escalated to CM? NO  RRAT Score: 12   Interventions:H2H  Testing due for pt today? NO  LOS: 3 days Expected length of stay 3 days  Discharge plan:  [address] [place] [address]   PCP: Manish Villaseñor MD  Transportation needs: Yes    Days before discharge:one day until discharge   Discharge disposition: Nursing Home    Signed:     Jaky Abel RN  5/7/2017  1:25 AM

## 2017-05-07 NOTE — PROGRESS NOTES
Bedside and Verbal shift change report given to Tan Ayala RN (oncoming nurse) by Sara Anderson (offgoing nurse).  Report included the following information SBAR, Kardex, MAR, Med Rec Status and Cardiac Rhythm SB.

## 2017-05-07 NOTE — DISCHARGE SUMMARY
DISCHARGE SUMMARY        PATIENT ID: Mariel Coles  MRN: [id number]   YOB: 1940   AGE: 67 y.o. DATE OF ADMISSION: 5/2/2017  3:07 PM    DATE OF DISCHARGE: 5/7/2017  PRIMARY CARE PROVIDER: Tammie Murphy MD       DISCHARGING PHYSICIAN: Gonzalez Abad MD    To contact physician, please call [phone] and ask the  to page or ask to be transferred the Adult Hospitalist Department. Discharge Diagnosis:   Patient Active Problem List    Diagnosis Date Noted    UTI (urinary tract infection) 05/04/2017    Weakness 05/02/2017    Chronic insomnia 10/20/2015    Chronic leg pain 01/22/2015    AR (allergic rhinitis) 07/10/2014    Obesity 05/15/2014    Constipation 03/15/2012    S/P angioplasty with stent 06/30/2011    Obesity, unspecified 02/04/2010    Hypothyroidism 02/01/2010    Pure hypercholesterolemia 02/01/2010    Reflux esophagitis 02/01/2010    CAD (coronary artery disease) 02/01/2010    Insomnia 02/01/2010    Depressive disorder, not elsewhere classified 02/01/2010    Venous insufficiency 02/01/2010       CONSULTATIONS: ED CONSULT TO SENIOR SERVICES CASE MANAGEMENT  ED CONSULT TO SENIOR SERVICES PHYSICAL THERAPY  IP CONSULT TO HOSPITALIST  IP CONSULT TO CARDIOLOGY  IP CONSULT TO NEUROSURGERY    History of Present Ilness:  25-year-old female with past medical history of bilateral hip replacement, history of gait instability, coronary artery disease, morbid obesity, who presents to the hospital with the above mentioned symptoms. The patient reports that she lives at [facility] assisted living facility, has chronic gait instability, chronic edema of her legs and trouble walking. The patient reports that she was at her baseline yesterday where she walks a few steps with the help of a walker, but had a fall and felt that her legs gave away. She reports that she scraped her left knee. Hospital Course:   1.  Multiple falls and lower extremity weakness: possibly mutlifactorial with lumbar stenosis. MRI of the lumbar spine with: L3-4 moderate-severe central spinal canal stenosis and moderate bilateral foraminal stenosis. L4-5 superimposed right central disc protrusion may affect the descending right L5 nerve as well as the right L4 nerve. Moderate central spinal canal stenosis at L4-5.; Multilevel 1 mm grade 1 retrolisthesis and facet arthrosis. . NS consulted for further recs. 2.GNR UTI:  Now on IV rocephin. Final cx reviewed. 3. Elevated troponin: trended up. On ASA and plavix. ECHO with EF of 55%. Cardiology consulted. On ASA and plavix. 4. Mild rhabdomyolysis: On IVF resolved  5. Recent fall with left knee pain: x-ray left knee neg for fracture, shows OA. Physical Exam on Discharge:  Visit Vitals    /46 (BP 1 Location: Left arm, BP Patient Position: At rest)    Pulse (!) 57    Temp 97.9 °F (36.6 °C)    Resp 20    Ht 5' 4\" (1.626 m)    Wt 115.1 kg (253 lb 12.3 oz)    SpO2 96%    Breastfeeding No    BMI 43.56 kg/m2       General:  Alert, cooperative, no distress, appears stated age. Lungs:   Clear to auscultation bilaterally. Chest wall:  No tenderness or deformity. Heart:  Regular rate and rhythm, S1, S2 normal, no murmur, click, rub or gallop. Abdomen:   Soft, non-tender. Bowel sounds normal. No masses,  No organomegaly. Extremities: Extremities normal, atraumatic, no cyanosis or edema. Pulses: 2+ and symmetric all extremities.    Skin: Skin color, texture, turgor normal. No rashes or lesions   Neurologic: AAO       Pertinent Results:    Recent Labs      05/05/17   0322   BUN  13   NA  142   CO2  25     Recent Labs      05/05/17   0322   WBC  6.4   RBC  3.79*   HCT  36.2   MCV  95.5   MCH  30.9   MCHC  32.3   RDW  14.0       EKG:   CXR:    Tests pending at discharge:     DISCHARGE MEDICATIONS:   Current Discharge Medication List      START taking these medications    Details   cephALEXin (KEFLEX) 500 mg capsule Take 1 Cap by mouth three (3) times daily for 5 days. Qty: 15 Cap, Refills: 0         CONTINUE these medications which have NOT CHANGED    Details   aspirin (ASPIRIN) 325 mg tablet Take 325 mg by mouth three (3) times daily. zolpidem (AMBIEN) 5 mg tablet 1 tab po HS PRN  Qty: 45 Tab, Refills: 3    Associated Diagnoses: Insomnia, unspecified type      NEXIUM 40 mg capsule TAKE 1 CAPSULE BY MOUTH EVERY DAY. Qty: 30 capsule, Refills: 5      clopidogrel (PLAVIX) 75 mg tablet TAKE 1 TABLET BY MOUTH EVERY DAY. Qty: 30 tablet, Refills: 5      levothyroxine (SYNTHROID) 100 mcg tablet TAKE 1 TABLET BY MOUTH EVERY MORNING. Qty: 30 Tab, Refills: 5             Condition at discharge:  Afrebrile  Ambulating with assistance  Eating, Drinking, Voiding  Stable    FOLLOW UP APPOINTMENTS:    Follow-up Information     Follow up With Details Comments Contact Info    [facility] [address] [place] [address]      Darylene Skinner, MD   [address]  [address] [facility]  [phone]             Disposition:  SNF       Total discharge preparation time was 35  minutes.     Calli Avnedaño MD MPH  Hospitalist.   [address] [facility]   Pager [phone]

## 2017-05-09 ENCOUNTER — EXTERNAL NURSING HOME DOCUMENTATION (OUTPATIENT)
Dept: INTERNAL MEDICINE CLINIC | Age: 77
End: 2017-05-09

## 2017-05-09 DIAGNOSIS — F32.89 DEPRESSIVE DISORDER, NOT ELSEWHERE CLASSIFIED: ICD-10-CM

## 2017-05-09 DIAGNOSIS — R26.9 GAIT ABNORMALITY: ICD-10-CM

## 2017-05-09 DIAGNOSIS — I25.83 CORONARY ARTERY DISEASE DUE TO LIPID RICH PLAQUE: ICD-10-CM

## 2017-05-09 DIAGNOSIS — R29.6 MULTIPLE FALLS: ICD-10-CM

## 2017-05-09 DIAGNOSIS — I25.10 CORONARY ARTERY DISEASE DUE TO LIPID RICH PLAQUE: ICD-10-CM

## 2017-05-09 DIAGNOSIS — E03.9 HYPOTHYROIDISM, UNSPECIFIED TYPE: ICD-10-CM

## 2017-05-09 DIAGNOSIS — M48.061 SPINAL STENOSIS OF LUMBAR REGION: Primary | ICD-10-CM

## 2017-05-09 NOTE — PROGRESS NOTES
History of Present Illness:   Ms. Blayne Garcia is a 67year-old, morbidly obese, white female who has been a patient for a long period of time. She went to a spa and while she was coming back, she was not able to walk anymore, so she was brought back to [facility] with help of the  and she was placed in a room and for almost 15 - 20 hours she was sitting in a chair in the room and did not respond. The nurse navigator said she is not able to walk. She was, not able to walk at all, so she was referred to the emergency room by me and she had an MRI of the lower back done, which showed spinal stenosis with retrolisthesis. She also had multilevel DJD and foraminal narrowing. Neurosurgery was consulted. She was advised to have physical therapy and occupational therapy. No surgical intervention was advised during this period of time. During this hospitalization, she was also found to have urinary tract infection for which she was treated with Keflex. She was stabilized and was transferred to [facility] for further physical therapy and occupational therapy. I am seeing her here. She is doing quite well. Her weakness is getting better. She is able to walk with help. Past Medical History:   1. DJD in the lower back, spinal stenosis. 2. Hypothyroidism. 3. Coronary artery disease. 4. Morbid obesity. 5. Gait instability. Past Surgical History:   Not known. Allergies:  She is not allergic to any medications. Medications:  Aspirin 325 mg one tablet three times a day, Plavix 75 mg once a day, Levothyroxine 100 mcg one tablet in the morning, Nexium 40 mg one every day, Ambien 5 mg h.s. p.r.n. Social History:   The patient denies any use of alcohol or tobacco or IV drug abuse. She lives in [address] [place] [address]. Her son lives in [place]. Family History:  Noncontributory. Review of Systems:  A complete review of systems was done.   Right now, essentially negative. Physical Examination:     GENERAL:  This is a pleasant, white female not appearing in any distress. VITALS:  T:  Afebrile, 98.4 degrees Fahrenheit. P:  60 per minute. BP:  121/59 mmHg. SaO2:  97% on room air. HEENT:  No abnormality detected. NECK:  Supple, no JVD, no carotid bruits, no thyromegaly. CHEST:  Chest is clear to auscultation bilaterally. No rales, no rhonchi. CARDIOVASCULAR:  S1, S2 normal.  Regular rate and rhythm. ABDOMEN:  Soft, nontender, nondistended, bowel sounds present. EXTREMITIES:  No edema is noted. Dorsalis pedis pulse normal with equal flow. NEUROLOGICAL:  Alert and oriented x3. Cranial nerves II - XII grossly intact. Motor is 5/5 bilaterally. Sensory is within normal limits. Assessment and Plan:   1. Gait instability with lumbar stenosis. MRI of lumbar spine showed L3-L4 moderate to severe central canal stenosis and moderate bilateral  foraminal stenosis, L4-L5 , moderate spinal stenosis at L4-L5, multilevel grade 1 retrolisthesis . The patient was advised to have inpatient rehab. She will continue with PT and OT here. Her son is trying to get placement for her in [place]. It was decided she will be transferred from here to [place]. 2. Elevated troponin in the hospital.  The patient is on aspirin and Plavix. Echocardiogram was done and showed an ejection fraction of 55%. Medical therapy was advised. 3. Mild rhabdomyolysis due to prolonged sitting. IV fluids has resolved it. She is doing well. 4. Hypothyroidism. She is taking Synthroid. We will continue it for now. 5. Insomnia. She takes Ambien as needed. THIS IS NOT A COMPLETE MEDICAL RECORD ON THIS PATIENT.    THIS IS A PATIENT AT [facility].    PLEASE CONTACT THE FACILITY LISTED BELOW FOR MORE INFORMATION ON THIS PATIENT.    THE COMPLETE RECORD RESIDES WITH THIS LONG TERM CARE Adonis Juarez MD

## 2017-05-10 ENCOUNTER — PATIENT OUTREACH (OUTPATIENT)
Dept: INTERNAL MEDICINE CLINIC | Age: 77
End: 2017-05-10

## 2017-05-10 NOTE — PROGRESS NOTES
Margo from [facility] informed that pt and son have decided that rather than pt transition to [facility] here in [place], pt will move to [place] and be closer to son.

## 2017-05-11 ENCOUNTER — PATIENT OUTREACH (OUTPATIENT)
Dept: INTERNAL MEDICINE CLINIC | Age: 77
End: 2017-05-11

## 2017-05-11 NOTE — PROGRESS NOTES
Community Care Team Documentation for Patient in [facility]  Initial Follow up     Patient was admitted to [address] [place] [address] from 5/2/17 to 5/7/17. Patient was discharged to [facility], [facility], on 5/7/17 (date). Hospital Discharge diagnosis:  Weakness of legs caused by fall    PCP : Martha Laureano MD  Nurse Navigator in PCP office: Emili Chung    Information provided by SNF staff member, Sherron Willingham, is as follows:    SNF Attending Provider:  Dr. Martha Laureano  Anticipated discharge date from SNF:  Undetermined  SNF discharge plan:  ABX for UTI to complete 5/12. On plavix. On low sodium diet. Goals to strengthen and safety. Working with PT/OT. Plan to d/c to a [facility] in [place]. Psych consult placed.      Delmi Banda, MSN, RN, ACNS-BC, [facility]  Nurse Navigator, [place] [phone]

## 2017-05-18 ENCOUNTER — TELEPHONE (OUTPATIENT)
Dept: INTERNAL MEDICINE CLINIC | Age: 77
End: 2017-05-18

## 2017-05-18 ENCOUNTER — EXTERNAL NURSING HOME DOCUMENTATION (OUTPATIENT)
Dept: FAMILY MEDICINE CLINIC | Age: 77
End: 2017-05-18

## 2017-05-18 DIAGNOSIS — I87.2 VENOUS INSUFFICIENCY: ICD-10-CM

## 2017-05-18 DIAGNOSIS — R60.0 BILATERAL EDEMA OF LOWER EXTREMITY: Primary | ICD-10-CM

## 2017-05-18 DIAGNOSIS — R29.898 WEAKNESS OF BOTH LOWER EXTREMITIES: ICD-10-CM

## 2017-05-18 NOTE — PROGRESS NOTES
THIS IS NOT A COMPLETE MEDICAL RECORD ON THIS PATIENT. THIS IS A PATIENT AT [facility]. PLEASE CONTACT THE FACILITY LISTED BELOW FOR MORE INFORMATION ON THIS PATIENT. THE COMPLETE RECORD RESIDES WITH THIS LONG TERM CARE FACILITY. HISTORY OF PRESENT ILLNESS  Jovan Clancy is a 67 y.o. female. This patient is currently under the care of Dr. Harleen Sadler at [facility].  The patient was admitted to this facility following hospitalization related to UTI, multiple falls, and lower extremity weakness. Her past medical history includes bilateral hip replacement, gait instability, CAD, obesity, hypothyroidism, GERD, and insomnia. The patient is seen today per her request. She has noted some increased lower extremity edema. No chest pain or shortness of breath. She mentions she did have some mild pain to her right calf yesterday, but this has now resolved. No erythema or warmth noted. Patient is concerned, as she states she had history of DVT years ago after a hip surgery. HPI    Review of Systems   Constitutional: Negative for chills and fever. HENT: Negative for congestion. Respiratory: Negative for cough and shortness of breath. Cardiovascular: Positive for leg swelling. Negative for chest pain. Gastrointestinal: Negative for abdominal pain. Genitourinary: Negative. Musculoskeletal: Negative. Skin: Negative. Neurological: Negative. Negative for headaches. Endo/Heme/Allergies: Negative. Psychiatric/Behavioral: Negative. Physical Exam   Constitutional: She is oriented to person, place, and time. She appears well-developed and well-nourished. No distress. HENT:   Head: Normocephalic and atraumatic. Eyes: Conjunctivae are normal.   Cardiovascular: Normal rate and regular rhythm. Pulses:       Dorsalis pedis pulses are 2+ on the right side, and 2+ on the left side. Pulmonary/Chest: Effort normal and breath sounds normal. No respiratory distress.  She has no wheezes. She has no rales. Abdominal: Soft. Bowel sounds are normal. She exhibits no distension. There is no tenderness. Musculoskeletal:   Trace bilateral lower extremity edema-- no erythema  Negative Joshua's right   Neurological: She is alert and oriented to person, place, and time. Skin: Skin is warm and dry. Healing abrasion to left anterior lower leg   Psychiatric: She has a normal mood and affect. Nursing note and vitals reviewed. ASSESSMENT and PLAN  Encounter Diagnoses   Name Primary?  Bilateral edema of lower extremity Yes    Venous insufficiency     Weakness of both lower extremities      Will order bilateral lower extremity venous dopplers. Reviewed medications and side effects    Nursing to continue to monitor closely and notify MD/NP of any change in condition. Discussed above plan of care with Dr. Delroy Brasher.

## 2017-05-18 NOTE — TELEPHONE ENCOUNTER
EC called and stated that he is moving pt to [place] next week and would like to know if there is a hospital bed that Dr. Mynor Parkinson would suggest for her and if there is then he possibly could get Medicare to cover the cost. Please contact pts EC.

## 2017-05-22 NOTE — TELEPHONE ENCOUNTER
Son called in ref to hospital bed for pt. He would like to get it through [facility] in [place], [address] [place] [address]. There number is [phone]. Please call Kim Oliver and let him know once completed.

## 2017-05-23 NOTE — TELEPHONE ENCOUNTER
Call placed to [facility] in [place] and rep stated that the only way medicare will cover is if she changes her medicare to [place], rep also stated that family can pay out of pocket until change is made.     Call placed to [facility] number which message states no voicemail set up and to call again, will try call at later time

## 2017-05-25 ENCOUNTER — PATIENT OUTREACH (OUTPATIENT)
Dept: CASE MANAGEMENT | Age: 77
End: 2017-05-25

## 2017-05-25 NOTE — PROGRESS NOTES
Community Care Team Documentation for Patient in [facility]  Subsequent Follow up     Patient remains at [facility] ([facility]). See previous [facility] Team notes. RRAT score: Low Risk            7       Total Score        3 Relationship with PCP    4 More than 1 Admission in calendar year        Criteria that do not apply:    Patient Living Status    Patient Length of Stay > 5    Patient Insurance is Medicare, Medicaid or Self Pay    Charlson Comorbidity Score         PCP : Fe Cruz MD  Nurse Navigator in PCP office: Michelle Torres    Information provided today by SNF staff member, Ramiro Cano, is as follows:    Plan to d/c tomorrow morning to [facility] in [place].      Anticipated discharge date from SNF:  5/26    SNF discharge plan:  [facility], [address]

## 2017-06-21 RX ORDER — LEVOTHYROXINE SODIUM 100 UG/1
TABLET ORAL
Qty: 30 TAB | Refills: 3 | Status: SHIPPED | OUTPATIENT
Start: 2017-06-21 | End: 2017-10-09 | Stop reason: SDUPTHER

## 2017-10-09 RX ORDER — LEVOTHYROXINE SODIUM 100 UG/1
TABLET ORAL
Qty: 90 TAB | Refills: 0 | Status: SHIPPED | OUTPATIENT
Start: 2017-10-09 | End: 2017-11-14 | Stop reason: SDUPTHER

## 2017-11-14 RX ORDER — LEVOTHYROXINE SODIUM 100 UG/1
TABLET ORAL
Qty: 90 TAB | Refills: 0 | Status: SHIPPED | OUTPATIENT
Start: 2017-11-14 | End: 2018-02-07 | Stop reason: SDUPTHER

## 2018-02-07 RX ORDER — LEVOTHYROXINE SODIUM 100 UG/1
TABLET ORAL
Qty: 90 TAB | Refills: 0 | Status: SHIPPED | OUTPATIENT
Start: 2018-02-07